# Patient Record
Sex: MALE | Race: WHITE | NOT HISPANIC OR LATINO | Employment: OTHER | URBAN - METROPOLITAN AREA
[De-identification: names, ages, dates, MRNs, and addresses within clinical notes are randomized per-mention and may not be internally consistent; named-entity substitution may affect disease eponyms.]

---

## 2017-01-10 RX ORDER — FUROSEMIDE 20 MG/1
TABLET ORAL
Qty: 90 TABLET | Refills: 1 | OUTPATIENT
Start: 2017-01-10

## 2017-01-10 RX ORDER — FOLIC ACID 1 MG/1
TABLET ORAL
Qty: 180 TABLET | Refills: 1 | Status: SHIPPED | OUTPATIENT
Start: 2017-01-10 | End: 2017-07-19 | Stop reason: SDUPTHER

## 2017-01-11 ENCOUNTER — CONVERSION ENCOUNTER (OUTPATIENT)
Dept: INTERNAL MEDICINE | Facility: CLINIC | Age: 72
End: 2017-01-11

## 2017-01-11 ENCOUNTER — OFFICE VISIT (OUTPATIENT)
Dept: INTERNAL MEDICINE | Facility: CLINIC | Age: 72
End: 2017-01-11

## 2017-01-11 VITALS
OXYGEN SATURATION: 95 % | RESPIRATION RATE: 20 BRPM | BODY MASS INDEX: 38.73 KG/M2 | SYSTOLIC BLOOD PRESSURE: 130 MMHG | TEMPERATURE: 98.8 F | WEIGHT: 251 LBS | DIASTOLIC BLOOD PRESSURE: 80 MMHG | HEART RATE: 78 BPM

## 2017-01-11 DIAGNOSIS — Z79.4 TYPE 2 DIABETES MELLITUS WITHOUT COMPLICATION, WITH LONG-TERM CURRENT USE OF INSULIN (HCC): ICD-10-CM

## 2017-01-11 DIAGNOSIS — R97.20 ELEVATED PSA: ICD-10-CM

## 2017-01-11 DIAGNOSIS — E66.01 MORBID OBESITY DUE TO EXCESS CALORIES (HCC): Primary | ICD-10-CM

## 2017-01-11 DIAGNOSIS — E11.9 TYPE 2 DIABETES MELLITUS WITHOUT COMPLICATION, WITH LONG-TERM CURRENT USE OF INSULIN (HCC): ICD-10-CM

## 2017-01-11 PROCEDURE — 99213 OFFICE O/P EST LOW 20 MIN: CPT | Performed by: NURSE PRACTITIONER

## 2017-01-11 NOTE — MR AVS SNAPSHOT
Alberto Bailey   1/11/2017 8:30 AM   Office Visit    Provider:  ALIREZA Cervantes   Department:  [facility] INTERNAL MEDICINE   Dept Phone:  [phone]                Your Full Care Plan              Today's Medication Changes          These changes are accurate as of: 1/11/17  9:58 AM.  If you have any questions, ask your nurse or doctor.               Medication(s)that have changed:     atenolol 25 MG tablet   Commonly known as:  TENORMIN   TAKE 1 TABLET BY MOUTH EVERY MORNING   What changed:  Another medication with the same name was removed. Continue taking this medication, and follow the directions you see here.       metOLazone 5 MG tablet   Commonly known as:  ZAROXOLYN   TAKE 0.5 TABLETS BY MOUTH 2 (TWO) TIMES A WEEK.   What changed:  Another medication with the same name was removed. Continue taking this medication, and follow the directions you see here.                  Your Updated Medication List          This list is accurate as of: 1/11/17  9:58 AM.  Always use your most recent med list.                acetaminophen 500 MG tablet   Commonly known as:  TYLENOL       albuterol 108 (90 BASE) MCG/ACT inhaler   Commonly known as:  PROVENTIL HFA;VENTOLIN HFA       allopurinol 300 MG tablet   Commonly known as:  ZYLOPRIM   TAKE 1 TABLET BY MOUTH EVERY DAY       aspirin 81 MG tablet       atenolol 25 MG tablet   Commonly known as:  TENORMIN   TAKE 1 TABLET BY MOUTH EVERY MORNING       buPROPion  MG 24 hr tablet   Commonly known as:  WELLBUTRIN XL   TAKE 1 TABLET EVERY MORNING       fluticasone 50 MCG/ACT nasal spray   Commonly known as:  FLONASE       folic acid 1 MG tablet   Commonly known as:  FOLVITE   TAKE 2 TABLETS EVERY DAY       furosemide 20 MG tablet   Commonly known as:  LASIX   Take 1 tablet by mouth every other day. Even days only       glucose blood test strip   Commonly known as:  ACCU-CHEK SERENITY PLUS   TEST 4 TIMES DAILY (Dx code: E11.9)          Insulin Glargine 100 UNIT/ML injection pen   Commonly known as:  LANTUS SOLOSTAR   Inject 80 Units under the skin Every Evening.       JANUMET  MG per tablet   Generic drug:  sitaGLIPtin-metFORMIN   TAKE 1 TABLET BY MOUTH TWICE A DAY       ketotifen 0.025 % ophthalmic solution   Commonly known as:  ZADITOR       levothyroxine 112 MCG tablet   Commonly known as:  SYNTHROID, LEVOTHROID   Take 1 tablet by mouth every morning.       lovastatin 20 MG tablet   Commonly known as:  MEVACOR   TAKE 1 TABLET BY MOUTH AT BEDTIME       Magnesium Oxide 400 (240 MG) MG tablet   Take 2 tablets by mouth daily.       metOLazone 5 MG tablet   Commonly known as:  ZAROXOLYN   TAKE 0.5 TABLETS BY MOUTH 2 (TWO) TIMES A WEEK.       Multiple Vitamin tablet       NOVOLOG FLEXPEN 100 UNIT/ML solution pen-injector sc pen   Generic drug:  insulin aspart   INJECT 6-12 UNITS 3 TIMES A DAY AS DIRECTED SLIDING SCALE       phentermine 37.5 MG tablet   Commonly known as:  ADIPEX-P   Take 1 tablet by mouth Every Morning Before Breakfast.       potassium chloride 20 MEQ CR tablet   Commonly known as:  K-DUR,KLOR-CON       ramipril 10 MG capsule   Commonly known as:  ALTACE   TAKE 1 CAPSULE EVERY 12 HOURS DAILY.       ranitidine 150 MG capsule   Commonly known as:  ZANTAC   Take 1 capsule by mouth Every Night.       terazosin 10 MG capsule   Commonly known as:  HYTRIN   TAKE ONE CAPSULE BY MOUTH AT BEDTIME       * topiramate 100 MG tablet   Commonly known as:  TOPAMAX   Take 1 tablet by mouth daily before supper.       * topiramate 25 MG tablet   Commonly known as:  TOPAMAX   TAKE 2 TABLET EVERY MORNING  MG AT BEDTIME       Vitamin D 1000 UNITS tablet       * Notice:  This list has 2 medication(s) that are the same as other medications prescribed for you. Read the directions carefully, and ask your doctor or other care provider to review them with you.            You Were Diagnosed With        Codes Comments    Morbid obesity due to  excess calories    -  Primary ICD-10-CM: E66.01  ICD-9-CM: 278.01     Type 2 diabetes mellitus without complication, with long-term current use of insulin     ICD-10-CM: E11.9, Z79.4  ICD-9-CM: 250.00, V58.67     Elevated PSA     ICD-10-CM: R97.20  ICD-9-CM: 790.93       Instructions    1. Continue Lantus 80 u at bedtime. Take Novolog 3 times/day before meals; do not take at bedtime.    2. Take Topiramate 50 mg before breakfast and 100 mg before supper.    3. Eat breakfast at 9:30AM, lunch at 2PM and supper 6-8PM.    4. Decrease bedtime snacking to fruit or low carb yogurt.    5. Start going to gym before breakfast on Monday, Wednesday, Friday for cycling.    6. Prevnar vaccine Rx given to pt - to get vaccine at pharmacy.    7. Plan colonoscopy for the Spring.    8. Continue other meds.    9. Non-fasting f/u in 1 month. Annual fasting exam in March.     Patient Instructions History      [employer] Signup     [facility] [employer] allows you to send messages to your doctor, view your test results, renew your prescriptions, schedule appointments, and more. To sign up, go to [employer] and click on the Sign Up Now link in the New User? box. Enter your [employer] Activation Code exactly as it appears below along with the last four digits of your Social Security Number and your Date of Birth () to complete the sign-up process. If you do not sign up before the expiration date, you must request a new code.    [employer] Activation Code: [email or web address]  Expires: 2017  9:58 AM    If you have questions, you can email [email or web address] or call [phone] to talk to our [employer] staff. Remember, [employer] is NOT to be used for urgent needs. For medical emergencies, dial 911.               Other Info from Your Visit           Your Appointments     Mar 10, 2017  9:00 AM EST   Physical with Eric Foy MD   [facility] INTERNAL [facility] (--)     Oscar Cannon, [place]ar[place].  [address] [place] [id number] [phone]           Arrive 15 minutes prior to appointment.              Allergies     No Known Allergies      Reason for Visit     Diabetes           Vital Signs     Blood Pressure Pulse Temperature Respirations Weight Oxygen Saturation    130/80 (BP Location: Right arm, Patient Position: Sitting) 78 98.8 °F (37.1 °C) (Oral) 20 251 lb (114 kg) 95%    Body Mass Index Smoking Status                38.73 kg/m2 Never Smoker          Problems and Diagnoses Noted     Diabetes    Elevated prostate specific antigen (PSA)    Severe obesity

## 2017-01-11 NOTE — PROGRESS NOTES
Subjective   Alberto Bailey is a 71 y.o. male.     History of Present Illness     1. Diabetes: Type 2. Pt's HA1c was 7.8 in early December. He reports today that his FBG had been running about 130 during the holidays. Last week he changed his nighttime snacking to eating low carb yogurt and his FBG since has ranged . Pt reports he had hard month in December; 4 friends passed away, car having trouble and home furnace trouble. This stress triggered poor eating habits and times. Also no exercise. Current meds: Lantus 80u bedtime, Novolog per sliding scale before meals and occasionally before bed if he eats large snack, Janumet 50/1000 mg BID. He plans to improve his diet and exercise program this month.    2. Obesity: Pt has gained 3# in the past month, which he attributes to poor eating habits last month. He continues taking Phentermine 37.5 daily. He takes Topiramate 50 mg before breakfast and 100 mg before bed. He thinks his downfall is eating his dinner meal late at night at times 10PM and late night snacking.     3. Pt has history of hypotestosteronism. He had been getting monthly Testosterone injections. Then in  August his PSA was 4.8. He was treated with Bactrim. Repeat PSA in September was 4.7. In Dec., his testosterone level was 219. Today he is to have repeat PSA; if normal may consider resuming Testosterone injections.    The following portions of the patient's history were reviewed and updated as appropriate: allergies, current medications, past family history, past medical history, past social history, past surgical history and problem list.    Review of Systems   Constitutional: Negative for fatigue and fever.   HENT: Negative for congestion and sore throat.    Eyes: Negative for pain and itching.   Respiratory: Negative for cough and shortness of breath.    Cardiovascular: Positive for leg swelling (intermittent). Negative for chest pain and palpitations.   Gastrointestinal: Negative for abdominal  pain and nausea.   Endocrine: Negative for cold intolerance and heat intolerance.   Genitourinary: Negative for dysuria and hematuria.   Musculoskeletal: Negative for arthralgias and back pain.   Skin: Negative for rash and wound.   Allergic/Immunologic: Negative for environmental allergies and food allergies.   Neurological: Negative for dizziness and headaches.   Hematological: Negative for adenopathy. Does not bruise/bleed easily.   Psychiatric/Behavioral: Positive for dysphoric mood (mildly depressed this past month improving). Negative for sleep disturbance. The patient is not nervous/anxious.        Objective   Blood pressure 130/80, pulse 78, temperature 98.8 °F (37.1 °C), temperature source Oral, resp. rate 20, weight 251 lb (114 kg), SpO2 95 %.    Physical Exam   Constitutional: He is oriented to person, place, and time. He appears well-developed and well-nourished.   Cardiovascular: Normal rate, regular rhythm and normal heart sounds.    No leg edema   Pulmonary/Chest: Effort normal and breath sounds normal. He has no wheezes.   Neurological: He is alert and oriented to person, place, and time.   Skin: Skin is warm and dry.   Psychiatric: He has a normal mood and affect. His speech is normal and behavior is normal. Thought content normal. He does not exhibit a depressed mood.   Vitals reviewed.    Procedures  Assessment/Plan   Alberto was seen today for diabetes.    Diagnoses and all orders for this visit:    Morbid obesity due to excess calories    Type 2 diabetes mellitus without complication, with long-term current use of insulin    Elevated PSA  -     PSA    1. Diabetes: Pt lost focus on his diet and exercise program over the past month. His FBG for the past week has improved with better choice of evening snacks. Pt does not want to work with a dietitian. He was counseled to tighten up on his Diabetic diet, eat meals at consistent times: 9:30AM, 2PM, and supper no later than 8PM. He is to limit his  nighttime snack to a piece of fruit or 1-2 low carb yogurts. He is to continue his current dose of Lantus, give Novolog before meals, but not before bed. He is to call in one week with his glucose readings.     2. Obesity: He is to continue Phentermine as ordered by Dr. Foy and Topiramate: take 50 mg before breakfast and switch Topiramate 100 mg to before supper to help curb his supper and nighttime cravings. He is to start going to the gym before breakfast on his days off work: MWF to do cycling.    3. Will repeat PSA today. If normal, may consider resuming Testosterone injections after reviewed by Dr. Foy.     4. Pt given Rx for Prevnar vaccine.    5. He was counseled about benefits of colonoscopy. Plan to refer this Spring.     He is to continue his other meds.    Non-fasting f/u in 1 month. Annual fasting exam in March.    Patient Instructions   1. Continue Lantus 80 u at bedtime. Take Novolog 3 times/day before meals; do not take at bedtime.    2. Take Topiramate 50 mg before breakfast and 100 mg before supper.    3. Eat breakfast at 9:30AM, lunch at 2PM and supper 6-8PM.    4. Decrease bedtime snacking to fruit or low carb yogurt.    5. Start going to gym before breakfast on Monday, Wednesday, Friday for cycling.    6. Prevnar vaccine Rx given to pt - to get vaccine at pharmacy.    7. Plan colonoscopy for the Spring.    8. Continue other meds.    9. Non-fasting f/u in 1 month. Annual fasting exam in March.                Electronically signed by ALIREZA Pichardo 1/11/2017 11:03 AM

## 2017-01-11 NOTE — PATIENT INSTRUCTIONS
1. Continue Lantus 80 u at bedtime. Take Novolog 3 times/day before meals; do not take at bedtime.    2. Take Topiramate 50 mg before breakfast and 100 mg before supper.    3. Eat breakfast at 9:30AM, lunch at 2PM and supper 6-8PM.    4. Decrease bedtime snacking to fruit or low carb yogurt.    5. Start going to gym before breakfast on Monday, Wednesday, Friday for cycling.    6. Prevnar vaccine Rx given to pt - to get vaccine at pharmacy.    7. Plan colonoscopy for the Spring.    8. Continue other meds.    9. Non-fasting f/u in 1 month. Annual fasting exam in March.

## 2017-01-16 LAB — PSA SERPL-MCNC: 3.5 NG/ML (ref 0–4)

## 2017-01-20 ENCOUNTER — TELEPHONE (OUTPATIENT)
Dept: INTERNAL MEDICINE | Facility: CLINIC | Age: 72
End: 2017-01-20

## 2017-01-20 NOTE — TELEPHONE ENCOUNTER
Talked to Pt and told him to fax his blood sugar reading to us. I gave him our fax number and he said he would do that.

## 2017-01-25 ENCOUNTER — TELEPHONE (OUTPATIENT)
Dept: INTERNAL MEDICINE | Facility: CLINIC | Age: 72
End: 2017-01-25

## 2017-01-27 ENCOUNTER — TELEPHONE (OUTPATIENT)
Dept: INTERNAL MEDICINE | Facility: CLINIC | Age: 72
End: 2017-01-27

## 2017-02-08 ENCOUNTER — OFFICE VISIT (OUTPATIENT)
Dept: INTERNAL MEDICINE | Facility: CLINIC | Age: 72
End: 2017-02-08

## 2017-02-08 VITALS
SYSTOLIC BLOOD PRESSURE: 130 MMHG | DIASTOLIC BLOOD PRESSURE: 70 MMHG | HEART RATE: 90 BPM | BODY MASS INDEX: 38.67 KG/M2 | RESPIRATION RATE: 20 BRPM | WEIGHT: 250.6 LBS | TEMPERATURE: 98.7 F | OXYGEN SATURATION: 96 %

## 2017-02-08 DIAGNOSIS — E23.0 GONADOTROPIN DEFICIENCY (HCC): Primary | ICD-10-CM

## 2017-02-08 DIAGNOSIS — Z79.4 TYPE 2 DIABETES MELLITUS WITHOUT COMPLICATION, WITH LONG-TERM CURRENT USE OF INSULIN (HCC): ICD-10-CM

## 2017-02-08 DIAGNOSIS — E23.0 GONADOTROPIN DEFICIENCY (HCC): ICD-10-CM

## 2017-02-08 DIAGNOSIS — E66.01 MORBID OBESITY DUE TO EXCESS CALORIES (HCC): Primary | ICD-10-CM

## 2017-02-08 DIAGNOSIS — R60.1 GENERALIZED EDEMA: ICD-10-CM

## 2017-02-08 DIAGNOSIS — E11.9 TYPE 2 DIABETES MELLITUS WITHOUT COMPLICATION, WITH LONG-TERM CURRENT USE OF INSULIN (HCC): ICD-10-CM

## 2017-02-08 PROCEDURE — 96372 THER/PROPH/DIAG INJ SC/IM: CPT | Performed by: NURSE PRACTITIONER

## 2017-02-08 PROCEDURE — 99213 OFFICE O/P EST LOW 20 MIN: CPT | Performed by: NURSE PRACTITIONER

## 2017-02-08 RX ORDER — FUROSEMIDE 20 MG/1
20 TABLET ORAL DAILY
Qty: 90 TABLET | Refills: 1 | Status: SHIPPED | OUTPATIENT
Start: 2017-02-08 | End: 2017-08-01 | Stop reason: SDUPTHER

## 2017-02-08 RX ORDER — TESTOSTERONE CYPIONATE 200 MG/ML
200 INJECTION, SOLUTION INTRAMUSCULAR
Qty: 10 ML | Refills: 0 | OUTPATIENT
Start: 2017-02-08 | End: 2018-01-01

## 2017-02-08 RX ORDER — TESTOSTERONE CYPIONATE 200 MG/ML
200 INJECTION, SOLUTION INTRAMUSCULAR ONCE
Status: COMPLETED | OUTPATIENT
Start: 2017-02-08 | End: 2017-02-08

## 2017-02-08 RX ADMIN — TESTOSTERONE CYPIONATE 200 MG: 200 INJECTION, SOLUTION INTRAMUSCULAR at 17:38

## 2017-02-08 NOTE — PATIENT INSTRUCTIONS
1. Start going to gym at least 3 days/week for stationary cycling.    2. Stop eating salty snacks, including pretzels. Good snacks: small container yogurt, apple, other fruit. Limit salt in rest of diet. Tighten up on Diabetic diet. Salty snacks most likely contributing to edema.     3. Continue current insulin dosing.    4. Bring blood sugar readings to every visit.    5. Take Tylenol ES 1-2 tabs each morning for arthritis pain as needed.    6. Track BP weekly and bring readings to each visit.    7. Monthly Testosterone injections resumed today.     8. May take additional dose of Furosemide today or tomorrow for edema.    9. Continue other meds.    10. Annual fasting exam in one month.

## 2017-02-08 NOTE — PROGRESS NOTES
Subjective   Alberto Bailey is a 71 y.o. male.     History of Present Illness     1. Diabetes: Pt reports his fasting blood glucose has averaged  for the past month and glucose before supper has averaged . He denies any symptoms of hypoglycemia. He continues Lantus 80 u QHS and Janumet 50/1000 mg BID. He eats breakfast and supper daily, but sometimes skips lunch. He gives Novolog prior to every meal according to the sliding scale.     2. Obesity: Pt's weight has remained stable over the past month with BMI of 38. He is taking Phentermine 37.5 mg daily, Topiramate 50 mg QAM and Topiramate 100 mg before supper. He feels that the evening Topiramate does reduce his desire to snack in the evenings. He reports giving up ice cream, substituting yogurt and fruit for evening snacking, yet he admits to also eating pretzels for snacking over the past month. He has been to the gym only 3 times in the past month, so very little exercise.    3. Edema: Pt reports he has had more generalized edema over the past month. He is taking Furosemide 20 mg daily and Metolazone 5mg every Tuesday and Friday. He feels his lack of weight loss may be due to retaining some fluid.    4. Pt c/o intermittent left knee pain over the past month. He feels he may be compensating for his chronic left hip pain, which has improved. He has had his left knee evaluated by Dr. Johnson in the past 2 years and was told that he has minimal arthritis in his knee. He has not been taking any medication for pain.    5. Pt has been off monthly testosterone injections due to elevated PSA last Fall of 4.8. He was treated with Bactrim in August. His PSA returned to 3.5 in January. He was advised that he may resume his monthly testosterone injections per Dr. Foy.     The following portions of the patient's history were reviewed and updated as appropriate: allergies, current medications, past family history, past medical history, past social history, past  surgical history and problem list.    Review of Systems   Constitutional: Negative for fatigue and fever.   HENT: Negative for congestion and sore throat.    Eyes: Negative for pain and itching.   Respiratory: Negative for cough and shortness of breath.    Cardiovascular: Positive for leg swelling (mild). Negative for chest pain and palpitations.   Gastrointestinal: Negative for abdominal pain and nausea.   Endocrine: Negative for cold intolerance and heat intolerance.   Genitourinary: Negative for dysuria and hematuria.   Musculoskeletal: Positive for arthralgias (left knee). Negative for back pain.   Skin: Negative for rash and wound.   Allergic/Immunologic: Negative for environmental allergies and food allergies.   Neurological: Negative for dizziness and headaches.   Hematological: Negative for adenopathy. Does not bruise/bleed easily.   Psychiatric/Behavioral: Negative for sleep disturbance. The patient is not nervous/anxious.        Objective   Blood pressure 130/70, pulse 90, temperature 98.7 °F (37.1 °C), temperature source Oral, resp. rate 20, weight 250 lb 9.6 oz (114 kg), SpO2 96 %.    Physical Exam   Constitutional: He is oriented to person, place, and time. He appears well-developed.   obese   Cardiovascular: Normal rate, regular rhythm and normal heart sounds.    No murmur heard.  Mild non-pitting edema bilateral lower legs   Pulmonary/Chest: Effort normal and breath sounds normal. He has no wheezes.   Abdominal: Soft. Bowel sounds are normal. There is no tenderness.   Musculoskeletal:   90% flexion left knee with mild pain upon resisted extension. No pain upon palpation of knee. No inflammation or effusion noted. Left hip 75% ROM without pain.    Neurological: He is alert and oriented to person, place, and time.   Skin: Skin is warm and dry.   Psychiatric: He has a normal mood and affect. His speech is normal and behavior is normal.   Vitals reviewed.    Procedures  Assessment/Plan   Alberto was seen  today for diabetes.    Diagnoses and all orders for this visit:    Morbid obesity due to excess calories    Type 2 diabetes mellitus without complication, with long-term current use of insulin    Gonadotropin deficiency    Generalized edema  -     furosemide (LASIX) 20 MG tablet; Take 1 tablet by mouth Daily.      1. Diabetes: Blood glucose is apparently in good control currently. He is to continue his current medications. He was counseled about changes to improve his diet, including watching his carbs and salt intake, with better choices for evening snacks.     2. Obesity: His BMI remains at 38. He was advised to get back to gym 3 days weekly and start working on stationary cycle. He is to continue current doses of Phentermine and Topiramate for the next month.     3. Edema: Pt has mild generalized edema today. He was instructed to cut salty foods out of his diet. He is to continue his current doses of Furosemide and Metolazone, but may take an additional Furosemide today or tomorrow.     4. Left knee pain: Exam fairly benign today. Return to cycling should help strengthen his support muscles. He was instructed to take Tylenol ES 1-2 tabs each morning for pain as needed. Will reassess in one month.     5. Testosterone injection was given today.    He is to continue his other meds.    Annual fasting exam in 1 month.    Patient Instructions   1. Start going to gym at least 3 days/week for stationary cycling.    2. Stop eating salty snacks, including pretzels. Good snacks: small container yogurt, apple, other fruit. Limit salt in rest of diet. Tighten up on Diabetic diet. Salty snacks most likely contributing to edema.     3. Continue current insulin dosing.    4. Bring blood sugar readings to every visit.    5. Take Tylenol ES 1-2 tabs each morning for arthritis pain as needed.    6. Track BP weekly and bring readings to each visit.    7. Monthly Testosterone injections resumed today.     8. Continue other  meds.    9. Annual fasting exam in one month.          Electronically signed by ALIREZA Pichardo 2/8/2017 10:00 AM

## 2017-02-15 RX ORDER — TERAZOSIN 10 MG/1
CAPSULE ORAL
Qty: 90 CAPSULE | Refills: 1 | Status: SHIPPED | OUTPATIENT
Start: 2017-02-15 | End: 2017-08-11 | Stop reason: SDUPTHER

## 2017-03-10 ENCOUNTER — OFFICE VISIT (OUTPATIENT)
Dept: INTERNAL MEDICINE | Facility: CLINIC | Age: 72
End: 2017-03-10

## 2017-03-10 VITALS
DIASTOLIC BLOOD PRESSURE: 80 MMHG | HEIGHT: 67 IN | TEMPERATURE: 98.2 F | HEART RATE: 76 BPM | OXYGEN SATURATION: 97 % | SYSTOLIC BLOOD PRESSURE: 134 MMHG | WEIGHT: 252 LBS | RESPIRATION RATE: 16 BRPM | BODY MASS INDEX: 39.55 KG/M2

## 2017-03-10 DIAGNOSIS — I10 ESSENTIAL HYPERTENSION: ICD-10-CM

## 2017-03-10 DIAGNOSIS — E79.0 HYPERURICEMIA: ICD-10-CM

## 2017-03-10 DIAGNOSIS — E03.4 HYPOTHYROIDISM DUE TO ACQUIRED ATROPHY OF THYROID: ICD-10-CM

## 2017-03-10 DIAGNOSIS — E66.01 MORBID OBESITY DUE TO EXCESS CALORIES (HCC): ICD-10-CM

## 2017-03-10 DIAGNOSIS — E53.9 VITAMIN B DEFICIENCY: ICD-10-CM

## 2017-03-10 DIAGNOSIS — I34.0 NON-RHEUMATIC MITRAL REGURGITATION: ICD-10-CM

## 2017-03-10 DIAGNOSIS — R97.20 ELEVATED PSA: ICD-10-CM

## 2017-03-10 DIAGNOSIS — E23.0 GONADOTROPIN DEFICIENCY (HCC): ICD-10-CM

## 2017-03-10 DIAGNOSIS — R60.1 GENERALIZED EDEMA: ICD-10-CM

## 2017-03-10 DIAGNOSIS — E55.9 VITAMIN D DEFICIENCY: ICD-10-CM

## 2017-03-10 DIAGNOSIS — Z79.4 TYPE 2 DIABETES MELLITUS WITHOUT COMPLICATION, WITH LONG-TERM CURRENT USE OF INSULIN (HCC): Primary | ICD-10-CM

## 2017-03-10 DIAGNOSIS — F33.42 RECURRENT MAJOR DEPRESSIVE DISORDER, IN FULL REMISSION (HCC): ICD-10-CM

## 2017-03-10 DIAGNOSIS — E78.49 OTHER HYPERLIPIDEMIA: ICD-10-CM

## 2017-03-10 DIAGNOSIS — D64.9 ANEMIA, UNSPECIFIED TYPE: ICD-10-CM

## 2017-03-10 DIAGNOSIS — Z12.5 ENCOUNTER FOR SCREENING FOR MALIGNANT NEOPLASM OF PROSTATE: ICD-10-CM

## 2017-03-10 DIAGNOSIS — K21.9 GASTROESOPHAGEAL REFLUX DISEASE WITHOUT ESOPHAGITIS: ICD-10-CM

## 2017-03-10 DIAGNOSIS — Z00.00 PREVENTATIVE HEALTH CARE: ICD-10-CM

## 2017-03-10 DIAGNOSIS — M16.0 PRIMARY OSTEOARTHRITIS OF BOTH HIPS: ICD-10-CM

## 2017-03-10 DIAGNOSIS — E11.9 TYPE 2 DIABETES MELLITUS WITHOUT COMPLICATION, WITH LONG-TERM CURRENT USE OF INSULIN (HCC): Primary | ICD-10-CM

## 2017-03-10 PROCEDURE — 93000 ELECTROCARDIOGRAM COMPLETE: CPT | Performed by: INTERNAL MEDICINE

## 2017-03-10 PROCEDURE — 99215 OFFICE O/P EST HI 40 MIN: CPT | Performed by: INTERNAL MEDICINE

## 2017-03-10 PROCEDURE — 96372 THER/PROPH/DIAG INJ SC/IM: CPT | Performed by: INTERNAL MEDICINE

## 2017-03-10 RX ORDER — TESTOSTERONE CYPIONATE 200 MG/ML
200 INJECTION, SOLUTION INTRAMUSCULAR ONCE
Status: COMPLETED | OUTPATIENT
Start: 2017-03-10 | End: 2017-03-10

## 2017-03-10 RX ADMIN — TESTOSTERONE CYPIONATE 200 MG: 200 INJECTION, SOLUTION INTRAMUSCULAR at 17:33

## 2017-03-10 NOTE — PROGRESS NOTES
"Subjective   Alberto Bailey is a 71 y.o. male.     History of Present Illness         The following portions of the patient's history were reviewed and updated as appropriate: allergies, current medications, past family history, past medical history, past social history, past surgical history and problem list.    Review of Systems    Objective   Blood pressure 134/80, pulse 76, temperature 98.2 °F (36.8 °C), temperature source Oral, resp. rate 16, height 67\" (170.2 cm), weight 252 lb (114 kg), SpO2 97 %.    Physical Exam  Procedures  Assessment/Plan   There are no diagnoses linked to this encounter.  There are no Patient Instructions on file for this visit.    This encounter was created in error - please disregard.    This note was created in error and has been voided as erroneous.  Electronically signed Eric Foy M.D.         "

## 2017-03-10 NOTE — PATIENT INSTRUCTIONS
1.  90 minutes of exercise at the [facility] - 3 days weekly.  Consider stationary cycle, water aerobics, resistance exercises.    2.  Follow a low-calorie diabetic diet - low in salt and low in sugar.  Plan new visits to registered dietitian.    3.  Continue usual medicines and supplements - as listed.    4.  ON Monday Wednesday Friday - check your blood sugar 2 hours after meal - analyze effects of eating.    5.  Obtain knee-high support hose - to minimize edema.    6.  Continue monthly testosterone injections.    7.  Return in 3 months - fasting checkup.

## 2017-03-11 LAB
25(OH)D3+25(OH)D2 SERPL-MCNC: 36.5 NG/ML
ALBUMIN SERPL-MCNC: 4.5 G/DL (ref 3.2–4.8)
ALBUMIN/GLOB SERPL: 1.7 G/DL (ref 1.5–2.5)
ALP SERPL-CCNC: 98 U/L (ref 25–100)
ALT SERPL-CCNC: 29 U/L (ref 7–40)
APPEARANCE UR: CLEAR
AST SERPL-CCNC: 31 U/L (ref 0–33)
BASOPHILS # BLD AUTO: 0.02 10*3/MM3 (ref 0–0.2)
BASOPHILS NFR BLD AUTO: 0.2 % (ref 0–1)
BILIRUB SERPL-MCNC: 0.5 MG/DL (ref 0.3–1.2)
BILIRUB UR QL STRIP: NEGATIVE
BUN SERPL-MCNC: 28 MG/DL (ref 9–23)
BUN/CREAT SERPL: 21.5 (ref 7–25)
CALCIUM SERPL-MCNC: 10.4 MG/DL (ref 8.7–10.4)
CHLORIDE SERPL-SCNC: 96 MMOL/L (ref 99–109)
CHOLEST SERPL-MCNC: 120 MG/DL (ref 0–200)
CO2 SERPL-SCNC: 38 MMOL/L (ref 20–31)
COLOR UR: YELLOW
CONV COMMENT: ABNORMAL
CREAT SERPL-MCNC: 1.3 MG/DL (ref 0.6–1.3)
CRP SERPL-MCNC: 6.1 MG/DL (ref 0–10)
EOSINOPHIL # BLD AUTO: 0.52 10*3/MM3 (ref 0.1–0.3)
EOSINOPHIL NFR BLD AUTO: 5.5 % (ref 0–3)
ERYTHROCYTE [DISTWIDTH] IN BLOOD BY AUTOMATED COUNT: 14.4 % (ref 11.3–14.5)
GLOBULIN SER CALC-MCNC: 2.6 GM/DL
GLUCOSE SERPL-MCNC: 122 MG/DL (ref 70–100)
GLUCOSE UR QL: NEGATIVE
HBA1C MFR BLD: 7.4 % (ref 4.8–5.6)
HCT VFR BLD AUTO: 38.9 % (ref 38.9–50.9)
HDLC SERPL-MCNC: 37 MG/DL (ref 40–60)
HGB BLD-MCNC: 12.4 G/DL (ref 13.1–17.5)
HGB UR QL STRIP: NEGATIVE
IMM GRANULOCYTES # BLD: 0.02 10*3/MM3 (ref 0–0.03)
IMM GRANULOCYTES NFR BLD: 0.2 % (ref 0–0.6)
KETONES UR QL STRIP: NEGATIVE
LDLC SERPL CALC-MCNC: 59 MG/DL (ref 0–100)
LEUKOCYTE ESTERASE UR QL STRIP: NEGATIVE
LYMPHOCYTES # BLD AUTO: 2.25 10*3/MM3 (ref 0.6–4.8)
LYMPHOCYTES NFR BLD AUTO: 24 % (ref 24–44)
MAGNESIUM SERPL-MCNC: 1.5 MG/DL (ref 1.3–2.7)
MCH RBC QN AUTO: 31.2 PG (ref 27–31)
MCHC RBC AUTO-ENTMCNC: 31.9 G/DL (ref 32–36)
MCV RBC AUTO: 97.7 FL (ref 80–99)
MONOCYTES # BLD AUTO: 0.68 10*3/MM3 (ref 0–1)
MONOCYTES NFR BLD AUTO: 7.3 % (ref 0–12)
NEUTROPHILS # BLD AUTO: 5.88 10*3/MM3 (ref 1.5–8.3)
NEUTROPHILS NFR BLD AUTO: 62.8 % (ref 41–71)
NITRITE UR QL STRIP: NEGATIVE
PH UR STRIP: 6 [PH] (ref 5–8)
PLATELET # BLD AUTO: 176 10*3/MM3 (ref 150–450)
POTASSIUM SERPL-SCNC: 4.1 MMOL/L (ref 3.5–5.5)
PROT SERPL-MCNC: 7.1 G/DL (ref 5.7–8.2)
PROT UR QL STRIP: NEGATIVE
RBC # BLD AUTO: 3.98 10*6/MM3 (ref 4.2–5.76)
SODIUM SERPL-SCNC: 139 MMOL/L (ref 132–146)
SP GR UR: 1.01 (ref 1–1.03)
T3FREE SERPL-MCNC: 3 PG/ML (ref 2–4.4)
T4 FREE SERPL-MCNC: 1.26 NG/DL (ref 0.89–1.76)
TESTOST SERPL-MCNC: 242 NG/DL (ref 348–1197)
TRIGL SERPL-MCNC: 121 MG/DL (ref 0–150)
TSH SERPL DL<=0.005 MIU/L-ACNC: 4.07 MIU/ML (ref 0.35–5.35)
URATE SERPL-MCNC: 6.7 MG/DL (ref 3.7–9.2)
UROBILINOGEN UR STRIP-MCNC: NORMAL MG/DL
VIT B12 SERPL-MCNC: 454 PG/ML (ref 211–911)
VLDLC SERPL CALC-MCNC: 24.2 MG/DL
WBC # BLD AUTO: 9.37 10*3/MM3 (ref 3.5–10.8)

## 2017-03-11 RX ORDER — LEVOTHYROXINE SODIUM 112 UG/1
TABLET ORAL
Qty: 90 TABLET | Refills: 1 | Status: CANCELLED | OUTPATIENT
Start: 2017-03-11

## 2017-03-12 NOTE — PROGRESS NOTES
Subjective   Alberto Bailey is a 71 y.o. male.     Chief Complaint   Patient presents with   • Hyperlipidemia       History of Present Illness     The patient has had a lifelong history of severe obesity often in the morbid range.  His total body weight has been as high as 300.  He has been extensively counseled and monitored for weight loss.  He is currently on topiramate and phentermine for appetite suppression.  His last weight was 250 several months ago.  He has a profound family history for stroke in both parents.  He has moderate cardiovascular risk from type 2 diabetes with poor control and hemoglobin A1c of 7.8 last fall.  He has moderate hyperlipidemia and hypertension.    The patient's had moderate hip pain progressing in recent years.  X-ray shows advanced DJD of both hips.  He has greatly limited his walking.  He has tried to maintain an exercise program at the [facility] but often limits his visits months at a time.  Tylenol gives him adequate pain control.    The following portions of the patient's history were reviewed and updated as appropriate: allergies, current medications, past family history, past medical history, past social history, past surgical history and problem list.    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   • Edema 05/02/2016   • Gastroesophageal reflux disease 05/02/2016   • Gout 05/02/2016   • Hyperlipidemia 05/02/2016   • Hypertension 05/02/2016   • Hypothyroidism 05/02/2016   • Mitral valve insufficiency 05/02/2016   • Morbid obesity 05/02/2016   • Osteoarthritis of hip 05/02/2016   • Gonadotropin deficiency 05/02/2016   • Preventative health care 08/19/2016   • Elevated PSA 11/02/2016   • Hyperuricemia 03/10/2017     Resolved Ambulatory Problems     Diagnosis Date Noted   • Exposure to hepatitis C 12/02/2016     Past Medical History    Diagnosis Date   • Diabetes mellitus, type 2    • Essential hypertriglyceridemia    • GERD (gastroesophageal reflux disease)    • Hypogonadism male    • Hypomagnesemia    • Joint pain, knee    • Metabolic encephalopathy    • Mitral regurgitation    • Osteoarthritis    • Sleep disturbances    • Strain, back    • Vitamin D deficiency      Past Surgical History   Procedure Laterality Date   • Tonsillectomy       Family History   Problem Relation Age of Onset   • Alzheimer's disease Mother    • Breast cancer Mother    • Diabetes Mother    • Stroke Mother       age 75 approximately   • Thyroid disease Mother    • Hypertension Father    • Kidney failure Father       age 75   • Stroke Father    • Glaucoma Sister    • Osteoarthritis Sister      Knee replacement   • Brain cancer Paternal Grandmother    • Glaucoma Maternal Grandmother    • Colon polyps Cousin      Social History     Social History   • Marital status: Single     Spouse name: N/A   • Number of children: N/A   • Years of education: N/A     Occupational History   • Not on file.     Social History Main Topics   • Smoking status: Never Smoker   • Smokeless tobacco: Never Used   • Alcohol use No   • Drug use: No   • Sexual activity: Not on file     Other Topics Concern   • Not on file     Social History Narrative    Domestic life : Lives in private home alone        [employer] : [employer]        Sleep hygiene : Sleeps 5 or 6 hours nightly        Caffeine use : No caffeine        Exercise habits : Walks 10 minutes 4 times daily while at work 3 days weekly.  Goes [facility] [employer] intermittently.        Diet : Low calorie diabetic diet low in salt and low in sugar        Occupation : Retired  as a .  Works at [employer] 3 days weekly.        Hearing : No impairment        Vision : Corrects with glasses        Driving : No limitations.             Review of Systems   Constitutional: Positive for fatigue. Negative for appetite  "change.        Fatigue improved on testosterone injections   HENT: Negative for ear pain and sore throat.    Eyes: Negative for itching and visual disturbance.   Respiratory: Negative for cough and shortness of breath.    Cardiovascular: Negative for chest pain and palpitations.   Gastrointestinal: Negative for abdominal pain and nausea.   Endocrine: Negative for cold intolerance and heat intolerance.   Genitourinary: Negative for dysuria and hematuria.   Musculoskeletal: Positive for arthralgias, back pain and gait problem.   Skin: Negative for rash and wound.   Allergic/Immunologic: Negative for environmental allergies and food allergies.   Neurological: Negative for dizziness and headaches.   Hematological: Negative for adenopathy. Does not bruise/bleed easily.   Psychiatric/Behavioral: Positive for dysphoric mood. Negative for sleep disturbance. The patient is not nervous/anxious.         Mood much more upbeat on bupropion       Objective   Blood pressure 134/80, pulse 76, temperature 98.2 °F (36.8 °C), temperature source Oral, resp. rate 16, height 67\" (170.2 cm), weight 252 lb (114 kg), SpO2 97 %.    Physical Exam   Constitutional: He is oriented to person, place, and time. He appears well-developed and well-nourished. No distress.   HENT:   Right Ear: External ear normal.   Left Ear: External ear normal.   Nose: Nose normal.   Mouth/Throat: Oropharynx is clear and moist.   Eyes: EOM are normal. Pupils are equal, round, and reactive to light. No scleral icterus.   Neck: Normal range of motion. Neck supple. No JVD present. No thyromegaly present.   Cardiovascular: Normal rate, regular rhythm, normal heart sounds and intact distal pulses.    Pulmonary/Chest: Effort normal and breath sounds normal. He has no wheezes. He has no rales.   Abdominal: Soft. Bowel sounds are normal. He exhibits no distension and no mass. There is no tenderness. No hernia.   Moderately obese   Genitourinary: Rectum normal, prostate " normal and penis normal.   Genitourinary Comments: Testicles normal   Musculoskeletal: He exhibits no edema or tenderness.   Hips and knees moderate stiffness.  Hips Limited range of motion.   Lymphadenopathy:     He has no cervical adenopathy.   Neurological: He is alert and oriented to person, place, and time. He displays normal reflexes. No cranial nerve deficit. He exhibits normal muscle tone. Coordination normal.   Vibratory normal  Romberg negative  Gait normal  Plantars downgoing  Monofilament normal   Skin: Skin is warm and dry. No rash noted.   Psychiatric: He has a normal mood and affect. His behavior is normal. Judgment and thought content normal.   Nursing note and vitals reviewed.      ECG 12 Lead  Date/Time: 3/10/2017 10:34 AM  Performed by: ERIC FOY  Authorized by: ERIC FOY   Interpreted by ED physician: Eric Foy M.D.  Comparison: compared with previous ECG from 1/11/2016  Similar to previous ECG  Rhythm: sinus rhythm  Rate: normal  BPM: 75  Conduction: complete RBBB, LAFB and non-specific intraventricular conduction delay  ST Segments: ST segments normal  T Waves: T waves normal  QRS axis: left  Other findings: LAE  Clinical impression: abnormal ECG  Comments: Indication mitral regurgitation  Mild sinus arrhythmia is new.  Otherwise baseline EKG.          Assessment/Plan   Alberto was seen today for hyperlipidemia.    Diagnoses and all orders for this visit:    Type 2 diabetes mellitus without complication, with long-term current use of insulin  -     Hemoglobin A1c    Hypothyroidism due to acquired atrophy of thyroid  -     TSH  -     T4, Free  -     T3, Free    Gonadotropin deficiency  -     Testosterone Cypionate (DEPOTESTOTERONE CYPIONATE) injection 200 mg; Inject 1 mL into the shoulder, thigh, or buttocks 1 (One) Time.  -     Testosterone    Other hyperlipidemia  -     Comprehensive Metabolic Panel  -     Lipid Panel    Essential hypertension  -     Urinalysis With /  Microscopic If Indicated    Non-rheumatic mitral regurgitation  -     ECG 12 Lead    Gastroesophageal reflux disease without esophagitis    Morbid obesity due to excess calories    Anemia, unspecified type  -     CBC & Differential  -     C-reactive Protein    Elevated PSA  -     Cancel: PSA Screen    Preventative health care    Recurrent major depressive disorder, in full remission    Generalized edema  -     Magnesium    Hyperuricemia  -     Uric Acid    Vitamin D deficiency  -     Vitamin D 25 Hydroxy    Vitamin B deficiency  -     Vitamin B12    Encounter for screening for malignant neoplasm of prostate   -     Cancel: PSA Screen    Primary osteoarthritis of both hips      The patient has a severe deterioration in his health poorly controlled diabetes, hyperlipidemia, hypertension, morbid obesity, with a backdrop of his family history of stroke in both parents.  I've counseled him on monitoring all of these conditions to improve his cardiovascular risk.  Fasting to continue on aspirin for primary prevention.    The patient's morbid obesity has improved in recent years on topiramate and phentermine.  I've asked him to continue working with the dietitian to be structural low-calorie diet.    The patient has severe advanced DJD of his hips.  He has limited in his walking but is able to walk 5 or 10 minutes at a time especially at work.  I've courage semimobile every one or 2 hours at work for at least 5-10 minutes.  He should continue Tylenol for symptom control.    The patient's lipid control is adequate with an LDL of 59.  He should continue on lovastatin.    Patient's hypertension is in adequate control on ramipril, atenolol, and terazosin.  His goal systolic blood pressure is 130.  He has an incidental significant edema that has not corrected despite his weight loss.  This appears to represent venous insufficiency.  Diuretics will help both his blood pressure and his edema management.    The patient's had a  recent increase of PSA to 4.7.  This apparently was a mild prostate congestion with possible prostatitis.  He has corrected and can resume testosterone injections.    The patient's TSH is gradually increase to 4.0 last year.  He will need increase his levothyroxine to 125 for goal TSH of 102.    The patient's had substantial anemia in the last few years.  This has greatly corrected now with a hematocrit of 39%.  This may be partly related to his testosterone deficiency and this will be an extra benefit of replacement therapy.    Patient Instructions   1.  90 minutes of exercise at the [facility] - 3 days weekly.  Consider stationary cycle, water aerobics, resistance exercises.    2.  Follow a low-calorie diabetic diet - low in salt and low in sugar.  Plan new visits to registered dietitian.    3.  Continue usual medicines and supplements - as listed.    4.  ON Monday Wednesday Friday - check your blood sugar 2 hours after meal - analyze effects of eating.    5.  Obtain knee-high support hose - to minimize edema.    6.  Continue monthly testosterone injections.    7.  Return in 3 months - fasting checkup.    8.  Hemoglobin A1c mildly improved to 7.4%.  Improve diet and exercise to bring below 7%.    9.  Magnesium level borderline low at 1.5.  Continue magnesium replacement.    10.  TSH indicates mild thyroid insufficiency.  Increase levothyroxine 125 MCG daily.    11.  Other laboratory tests are acceptable and require no change in treatment.    Current Outpatient Prescriptions:   •  acetaminophen (TYLENOL) 500 MG tablet, Take 2 tablets by mouth as needed., Disp: , Rfl:   •  albuterol (PROVENTIL HFA;VENTOLIN HFA) 108 (90 BASE) MCG/ACT inhaler, Inhale 1 puff as needed., Disp: , Rfl:   •  allopurinol (ZYLOPRIM) 300 MG tablet, TAKE 1 TABLET BY MOUTH EVERY DAY, Disp: 90 tablet, Rfl: 1  •  aspirin 81 MG tablet, Take 1 tablet by mouth every morning., Disp: , Rfl:   •  atenolol (TENORMIN) 25 MG tablet, TAKE 1 TABLET BY MOUTH EVERY  MORNING, Disp: 90 tablet, Rfl: 1  •  buPROPion XL (WELLBUTRIN XL) 150 MG 24 hr tablet, TAKE 1 TABLET EVERY MORNING, Disp: 90 tablet, Rfl: 1  •  Cholecalciferol (VITAMIN D) 1000 UNITS tablet, Take 3 tablets by mouth daily., Disp: , Rfl:   •  fluticasone (FLONASE) 50 MCG/ACT nasal spray, 1 spray into each nostril daily as needed., Disp: , Rfl:   •  folic acid (FOLVITE) 1 MG tablet, TAKE 2 TABLETS EVERY DAY, Disp: 180 tablet, Rfl: 1  •  furosemide (LASIX) 20 MG tablet, Take 1 tablet by mouth Daily., Disp: 90 tablet, Rfl: 1  •  glucose blood (ACCU-CHEK SERENITY PLUS) test strip, TEST 4 TIMES DAILY (Dx code: E11.9), Disp: 200 each, Rfl: 2  •  Insulin Glargine (LANTUS SOLOSTAR) 100 UNIT/ML injection pen, Inject 80 Units under the skin Every Evening., Disp: 30 mL, Rfl: 2  •  JANUMET  MG per tablet, TAKE 1 TABLET BY MOUTH TWICE A DAY, Disp: 180 tablet, Rfl: 1  •  levothyroxine (SYNTHROID, LEVOTHROID) 112 MCG tablet, Take 1 tablet by mouth every morning., Disp: 90 tablet, Rfl: 1  •  lovastatin (MEVACOR) 20 MG tablet, TAKE 1 TABLET BY MOUTH AT BEDTIME, Disp: 90 tablet, Rfl: 1  •  Magnesium Oxide 400 (240 MG) MG tablet, Take 2 tablets by mouth daily., Disp: 180 tablet, Rfl: 1  •  metOLazone (ZAROXOLYN) 5 MG tablet, TAKE 0.5 TABLETS BY MOUTH 2 (TWO) TIMES A WEEK., Disp: 30 tablet, Rfl: 1  •  Multiple Vitamin tablet, Take 1 tablet by mouth every morning., Disp: , Rfl:   •  NOVOLOG FLEXPEN 100 UNIT/ML solution pen-injector sc pen, INJECT 6-12 UNITS 3 TIMES A DAY AS DIRECTED SLIDING SCALE, Disp: 100 mL, Rfl: 2  •  phentermine (ADIPEX-P) 37.5 MG tablet, Take 1 tablet by mouth Every Morning Before Breakfast., Disp: 30 tablet, Rfl: 2  •  potassium chloride (K-DUR,KLOR-CON) 20 MEQ CR tablet, Take 1 tablet by mouth daily., Disp: , Rfl:   •  ramipril (ALTACE) 10 MG capsule, TAKE 1 CAPSULE EVERY 12 HOURS DAILY., Disp: 180 capsule, Rfl: 1  •  ranitidine (ZANTAC) 150 MG capsule, Take 1 capsule by mouth Every Night., Disp: 30 capsule,  Rfl: 11  •  terazosin (HYTRIN) 10 MG capsule, TAKE ONE CAPSULE BY MOUTH AT BEDTIME, Disp: 90 capsule, Rfl: 1  •  Testosterone Cypionate (DEPOTESTOTERONE CYPIONATE) 200 MG/ML injection, Inject 1 mL into the shoulder, thigh, or buttocks Every 28 (Twenty-Eight) Days., Disp: 10 mL, Rfl: 0  •  topiramate (TOPAMAX) 100 MG tablet, Take 1 tablet by mouth daily before supper., Disp: 90 tablet, Rfl: 1  •  topiramate (TOPAMAX) 25 MG tablet, TAKE 2 TABLET EVERY MORNING  MG AT BEDTIME, Disp: 60 tablet, Rfl: 5    No Known Allergies    Immunization History   Administered Date(s) Administered   • Influenza, Quadrivalent 10/16/2015   • Pneumococcal Polysaccharide 01/09/2008   • Tdap 03/19/2012   • influenza Split 10/31/2016     Electronically signed Eric Foy M.D.3/12/2017 2:29 PM

## 2017-03-13 RX ORDER — LEVOTHYROXINE SODIUM 0.12 MG/1
112 TABLET ORAL EVERY MORNING
Qty: 90 TABLET | Refills: 1 | Status: SHIPPED | OUTPATIENT
Start: 2017-03-13 | End: 2017-10-08 | Stop reason: SDUPTHER

## 2017-03-13 NOTE — TELEPHONE ENCOUNTER
Pt notified of labs - need to increase Levothyroxine to 125 mcg qd per TGF, anemia a little better, Haic 7.40 (was 7.8) per TGF

## 2017-03-14 RX ORDER — LEVOTHYROXINE SODIUM 112 UG/1
TABLET ORAL
Qty: 90 TABLET | Refills: 1 | OUTPATIENT
Start: 2017-03-14

## 2017-03-31 RX ORDER — RAMIPRIL 10 MG/1
10 CAPSULE ORAL EVERY 12 HOURS
Qty: 180 CAPSULE | Refills: 1 | Status: SHIPPED | OUTPATIENT
Start: 2017-03-31 | End: 2017-10-08 | Stop reason: SDUPTHER

## 2017-04-14 DIAGNOSIS — Z79.4 TYPE 2 DIABETES MELLITUS WITHOUT COMPLICATION, WITH LONG-TERM CURRENT USE OF INSULIN (HCC): ICD-10-CM

## 2017-04-14 DIAGNOSIS — E11.9 TYPE 2 DIABETES MELLITUS WITHOUT COMPLICATION, WITH LONG-TERM CURRENT USE OF INSULIN (HCC): ICD-10-CM

## 2017-04-14 RX ORDER — INSULIN GLARGINE 100 [IU]/ML
INJECTION, SOLUTION SUBCUTANEOUS
Qty: 30 ML | Refills: 2 | Status: SHIPPED | OUTPATIENT
Start: 2017-04-14 | End: 2017-04-25 | Stop reason: SDUPTHER

## 2017-04-17 RX ORDER — ALLOPURINOL 300 MG/1
300 TABLET ORAL DAILY
Qty: 90 TABLET | Refills: 1 | Status: SHIPPED | OUTPATIENT
Start: 2017-04-17 | End: 2017-10-15 | Stop reason: SDUPTHER

## 2017-04-25 DIAGNOSIS — E11.9 TYPE 2 DIABETES MELLITUS WITHOUT COMPLICATION, WITH LONG-TERM CURRENT USE OF INSULIN (HCC): ICD-10-CM

## 2017-04-25 DIAGNOSIS — Z79.4 TYPE 2 DIABETES MELLITUS WITHOUT COMPLICATION, WITH LONG-TERM CURRENT USE OF INSULIN (HCC): ICD-10-CM

## 2017-04-25 RX ORDER — LOVASTATIN 20 MG/1
20 TABLET ORAL NIGHTLY
Qty: 90 TABLET | Refills: 1 | Status: SHIPPED | OUTPATIENT
Start: 2017-04-25 | End: 2017-10-24 | Stop reason: SDUPTHER

## 2017-04-25 RX ORDER — LANOLIN ALCOHOL/MO/W.PET/CERES
2 CREAM (GRAM) TOPICAL DAILY
Qty: 180 TABLET | Refills: 1 | Status: SHIPPED | OUTPATIENT
Start: 2017-04-25 | End: 2017-10-24 | Stop reason: SDUPTHER

## 2017-04-28 RX ORDER — METOLAZONE 5 MG/1
TABLET ORAL
Qty: 30 TABLET | Refills: 1 | Status: SHIPPED | OUTPATIENT
Start: 2017-04-28 | End: 2018-01-01 | Stop reason: SDUPTHER

## 2017-05-10 RX ORDER — INSULIN ASPART 100 [IU]/ML
INJECTION, SOLUTION INTRAVENOUS; SUBCUTANEOUS
Qty: 3 ML | Refills: 2 | Status: SHIPPED | OUTPATIENT
Start: 2017-05-10 | End: 2017-09-18 | Stop reason: SDUPTHER

## 2017-05-15 RX ORDER — BUPROPION HYDROCHLORIDE 150 MG/1
TABLET ORAL
Qty: 90 TABLET | Refills: 1 | Status: SHIPPED | OUTPATIENT
Start: 2017-05-15 | End: 2017-11-10 | Stop reason: SDUPTHER

## 2017-05-15 RX ORDER — ATENOLOL 25 MG/1
TABLET ORAL
Qty: 90 TABLET | Refills: 1 | Status: SHIPPED | OUTPATIENT
Start: 2017-05-15 | End: 2017-11-10 | Stop reason: SDUPTHER

## 2017-06-19 ENCOUNTER — OFFICE VISIT (OUTPATIENT)
Dept: INTERNAL MEDICINE | Facility: CLINIC | Age: 72
End: 2017-06-19

## 2017-06-19 VITALS
WEIGHT: 256 LBS | DIASTOLIC BLOOD PRESSURE: 80 MMHG | TEMPERATURE: 98.9 F | SYSTOLIC BLOOD PRESSURE: 120 MMHG | OXYGEN SATURATION: 95 % | HEART RATE: 68 BPM | RESPIRATION RATE: 16 BRPM | BODY MASS INDEX: 40.1 KG/M2

## 2017-06-19 DIAGNOSIS — I10 ESSENTIAL HYPERTENSION: ICD-10-CM

## 2017-06-19 DIAGNOSIS — E66.01 MORBID OBESITY DUE TO EXCESS CALORIES (HCC): ICD-10-CM

## 2017-06-19 DIAGNOSIS — E23.0 GONADOTROPIN DEFICIENCY (HCC): ICD-10-CM

## 2017-06-19 DIAGNOSIS — E78.49 OTHER HYPERLIPIDEMIA: Primary | ICD-10-CM

## 2017-06-19 DIAGNOSIS — R97.20 ELEVATED PSA: ICD-10-CM

## 2017-06-19 DIAGNOSIS — Z79.4 TYPE 2 DIABETES MELLITUS WITHOUT COMPLICATION, WITH LONG-TERM CURRENT USE OF INSULIN (HCC): ICD-10-CM

## 2017-06-19 DIAGNOSIS — E11.9 TYPE 2 DIABETES MELLITUS WITHOUT COMPLICATION, WITH LONG-TERM CURRENT USE OF INSULIN (HCC): ICD-10-CM

## 2017-06-19 DIAGNOSIS — R73.9 HYPERGLYCEMIA: ICD-10-CM

## 2017-06-19 PROCEDURE — 96372 THER/PROPH/DIAG INJ SC/IM: CPT | Performed by: INTERNAL MEDICINE

## 2017-06-19 PROCEDURE — 99213 OFFICE O/P EST LOW 20 MIN: CPT | Performed by: INTERNAL MEDICINE

## 2017-06-19 RX ORDER — TESTOSTERONE CYPIONATE 200 MG/ML
200 INJECTION, SOLUTION INTRAMUSCULAR ONCE
Status: COMPLETED | OUTPATIENT
Start: 2017-06-19 | End: 2017-06-19

## 2017-06-19 RX ORDER — PHENTERMINE HYDROCHLORIDE 37.5 MG/1
37.5 TABLET ORAL
Qty: 30 TABLET | Refills: 2 | OUTPATIENT
Start: 2017-06-19 | End: 2018-01-01

## 2017-06-19 RX ADMIN — TESTOSTERONE CYPIONATE 200 MG: 200 INJECTION, SOLUTION INTRAMUSCULAR at 09:45

## 2017-06-19 NOTE — PROGRESS NOTES
Subjective   Alberto Bailey is a 71 y.o. male.     History of Present Illness     The patient has a lifelong history of morbid obesity and a 20 year history of progressive diabetes mellitus.  His last hemoglobin A1c had been brought down to 7.4%.  He has made great strides and progressive the last year at stabilizing his diabetes.  He has had a tendency for tightening his diabetic control and then going off of his treatment plan for several months at a time.    The following portions of the patient's history were reviewed and updated as appropriate: allergies, current medications, past family history, past medical history, past social history, past surgical history and problem list.    Review of Systems   Constitutional: Negative for appetite change and fatigue.   Gastrointestinal: Negative for abdominal distention and nausea.   Musculoskeletal: Positive for arthralgias and gait problem.        Moderate hip and left knee pain increasing   Neurological: Negative for dizziness and light-headedness.       Objective   Blood pressure 120/80, pulse 68, temperature 98.9 °F (37.2 °C), temperature source Oral, resp. rate 16, weight 256 lb (116 kg), SpO2 95 %.    Physical Exam   Constitutional: He is oriented to person, place, and time. He appears well-developed and well-nourished. No distress.   Cardiovascular: Normal rate, regular rhythm and normal heart sounds.    Pulmonary/Chest: Effort normal and breath sounds normal. He has no wheezes. He has no rales.   Musculoskeletal: Normal range of motion. He exhibits no edema.   Moderate tenderness of left hip and knee without stiffness or effusion    Neurological: He is alert and oriented to person, place, and time. He exhibits normal muscle tone. Coordination normal.   Psychiatric: He has a normal mood and affect. His behavior is normal. Judgment and thought content normal.   Nursing note and vitals reviewed.    Procedures  Assessment/Plan   Alberto was seen today for  diabetes.    Diagnoses and all orders for this visit:    Other hyperlipidemia  -     Comprehensive Metabolic Panel  -     Lipid Panel  -     Ambulatory Referral to Nutrition Services    Essential hypertension    Morbid obesity due to excess calories    Type 2 diabetes mellitus without complication, with long-term current use of insulin  -     Hemoglobin A1c  -     Ambulatory Referral to Nutrition Services    Gonadotropin deficiency  -     CBC & Differential  -     Testosterone  -     Testosterone Cypionate (DEPOTESTOTERONE CYPIONATE) injection 200 mg; Inject 1 mL into the shoulder, thigh, or buttocks 1 (One) Time.    Elevated PSA  -     PSA    Hyperglycemia   -     CBC & Differential    Other orders  -     phentermine (ADIPEX-P) 37.5 MG tablet; Take 1 tablet by mouth Every Morning Before Breakfast.    The patient has again failed office standard treatment plan.  He has long-standing supportive compliance with a poor diet and poor exercise.  His hemoglobin A1c has markedly risen with 30 point average increase in blood sugars.  I've counseled him on work with the nurse practitioner and dietitian.    The patient has many years of testosterone deficiency and is felt much less fatigue on testosterone.  His PSA and hematocrit indicate good tolerance of this medication.    The patient has moderate cardiovascular risk.  His LDL cholesterol is well managed at 78.  I've asked him to stay on aspirin for primary prevention.    Patient Instructions   1.  Meet with registered dietitian - new weight loss plan and improve control of diabetes.    2.  The with nurse practitioner every month - testosterone shots and diabetic clinic.    3.  Follow a low-calorie diabetic diet - low in salt and low in sugar.    4.  Avoid nighttime eating - except for simple fruits and low calorie foods.    5.  Stationary cycle 20 minutes 3 days weekly - build knee strength and fitness.    6.  Continue usual medicines and supplements - as listed.    7.   Return visit 3 months - fasting checkup.    8.  Hemoglobin A1c is much worse at 8.4%.  Work with diet, exercise, and medications to lower blood sugar levels.    9.  Other laboratory tests are acceptable and require no change in treatment.    Electronically signed Eric Foy M.D.6/20/2017 7:18 PM

## 2017-06-19 NOTE — PATIENT INSTRUCTIONS
1.  Meet with registered dietitian - new weight loss plan and improve control of diabetes.    2.  The with nurse practitioner every month - testosterone shots and diabetic clinic.    3.  Follow a low-calorie diabetic diet - low in salt and low in sugar.    4.  Avoid nighttime eating - except for simple fruits and low calorie foods.    5.  Stationary cycle 20 minutes 3 days weekly - build knee strength and fitness.    6.  Continue usual medicines and supplements - as listed.    7.  Return visit 3 months - fasting checkup.

## 2017-06-20 LAB
ALBUMIN SERPL-MCNC: 4.4 G/DL (ref 3.2–4.8)
ALBUMIN/GLOB SERPL: 1.7 G/DL (ref 1.5–2.5)
ALP SERPL-CCNC: 92 U/L (ref 25–100)
ALT SERPL-CCNC: 36 U/L (ref 7–40)
AST SERPL-CCNC: 35 U/L (ref 0–33)
BASOPHILS # BLD AUTO: 0.02 10*3/MM3 (ref 0–0.2)
BASOPHILS NFR BLD AUTO: 0.2 % (ref 0–1)
BILIRUB SERPL-MCNC: 0.5 MG/DL (ref 0.3–1.2)
BUN SERPL-MCNC: 22 MG/DL (ref 9–23)
BUN/CREAT SERPL: 18.3 (ref 7–25)
CALCIUM SERPL-MCNC: 10.4 MG/DL (ref 8.7–10.4)
CHLORIDE SERPL-SCNC: 102 MMOL/L (ref 99–109)
CHOLEST SERPL-MCNC: 150 MG/DL (ref 0–200)
CO2 SERPL-SCNC: 34 MMOL/L (ref 20–31)
CONV COMMENT: ABNORMAL
CREAT SERPL-MCNC: 1.2 MG/DL (ref 0.6–1.3)
EOSINOPHIL # BLD AUTO: 0.49 10*3/MM3 (ref 0.1–0.3)
EOSINOPHIL NFR BLD AUTO: 5.6 % (ref 0–3)
ERYTHROCYTE [DISTWIDTH] IN BLOOD BY AUTOMATED COUNT: 14.8 % (ref 11.3–14.5)
GLOBULIN SER CALC-MCNC: 2.6 GM/DL
GLUCOSE SERPL-MCNC: 94 MG/DL (ref 70–100)
HBA1C MFR BLD: 8.4 % (ref 4.8–5.6)
HCT VFR BLD AUTO: 38.2 % (ref 38.9–50.9)
HDLC SERPL-MCNC: 34 MG/DL (ref 40–60)
HGB BLD-MCNC: 11.9 G/DL (ref 13.1–17.5)
IMM GRANULOCYTES # BLD: 0.03 10*3/MM3 (ref 0–0.03)
IMM GRANULOCYTES NFR BLD: 0.3 % (ref 0–0.6)
LDLC SERPL CALC-MCNC: 78 MG/DL (ref 0–100)
LYMPHOCYTES # BLD AUTO: 1.96 10*3/MM3 (ref 0.6–4.8)
LYMPHOCYTES NFR BLD AUTO: 22.4 % (ref 24–44)
MCH RBC QN AUTO: 29.9 PG (ref 27–31)
MCHC RBC AUTO-ENTMCNC: 31.2 G/DL (ref 32–36)
MCV RBC AUTO: 96 FL (ref 80–99)
MONOCYTES # BLD AUTO: 0.78 10*3/MM3 (ref 0–1)
MONOCYTES NFR BLD AUTO: 8.9 % (ref 0–12)
NEUTROPHILS # BLD AUTO: 5.48 10*3/MM3 (ref 1.5–8.3)
NEUTROPHILS NFR BLD AUTO: 62.6 % (ref 41–71)
PLATELET # BLD AUTO: 164 10*3/MM3 (ref 150–450)
POTASSIUM SERPL-SCNC: 4.3 MMOL/L (ref 3.5–5.5)
PROT SERPL-MCNC: 7 G/DL (ref 5.7–8.2)
PSA SERPL-MCNC: 3.95 NG/ML (ref 0–4)
RBC # BLD AUTO: 3.98 10*6/MM3 (ref 4.2–5.76)
SODIUM SERPL-SCNC: 144 MMOL/L (ref 132–146)
TESTOST SERPL-MCNC: 276 NG/DL (ref 348–1197)
TRIGL SERPL-MCNC: 191 MG/DL (ref 0–150)
VLDLC SERPL CALC-MCNC: 38.2 MG/DL
WBC # BLD AUTO: 8.76 10*3/MM3 (ref 3.5–10.8)

## 2017-06-22 ENCOUNTER — TELEPHONE (OUTPATIENT)
Dept: INTERNAL MEDICINE | Facility: CLINIC | Age: 72
End: 2017-06-22

## 2017-06-22 NOTE — TELEPHONE ENCOUNTER
Pt called and notified labs all fine except Ha1c up at 8.4 (was 7.4). Pt states he knew it would be higher and plans to go to diabetic clinic every month . Enced to visit with dietician and improve on diet and exercise and continue with medications per TGF.

## 2017-07-19 RX ORDER — FOLIC ACID 1 MG/1
TABLET ORAL
Qty: 180 TABLET | Refills: 1 | Status: SHIPPED | OUTPATIENT
Start: 2017-07-19 | End: 2018-01-16 | Stop reason: SDUPTHER

## 2017-07-28 ENCOUNTER — OFFICE VISIT (OUTPATIENT)
Dept: INTERNAL MEDICINE | Facility: CLINIC | Age: 72
End: 2017-07-28

## 2017-07-28 VITALS
SYSTOLIC BLOOD PRESSURE: 140 MMHG | BODY MASS INDEX: 40.34 KG/M2 | RESPIRATION RATE: 16 BRPM | WEIGHT: 257 LBS | TEMPERATURE: 98.1 F | DIASTOLIC BLOOD PRESSURE: 70 MMHG | HEART RATE: 64 BPM | OXYGEN SATURATION: 97 % | HEIGHT: 67 IN

## 2017-07-28 DIAGNOSIS — E11.9 TYPE 2 DIABETES MELLITUS WITHOUT COMPLICATION, WITH LONG-TERM CURRENT USE OF INSULIN (HCC): ICD-10-CM

## 2017-07-28 DIAGNOSIS — K21.9 GASTROESOPHAGEAL REFLUX DISEASE WITHOUT ESOPHAGITIS: Primary | ICD-10-CM

## 2017-07-28 DIAGNOSIS — E23.0 GONADOTROPIN DEFICIENCY (HCC): ICD-10-CM

## 2017-07-28 DIAGNOSIS — Z79.4 TYPE 2 DIABETES MELLITUS WITHOUT COMPLICATION, WITH LONG-TERM CURRENT USE OF INSULIN (HCC): ICD-10-CM

## 2017-07-28 DIAGNOSIS — E66.01 MORBID OBESITY DUE TO EXCESS CALORIES (HCC): ICD-10-CM

## 2017-07-28 PROCEDURE — 96372 THER/PROPH/DIAG INJ SC/IM: CPT | Performed by: NURSE PRACTITIONER

## 2017-07-28 PROCEDURE — 99213 OFFICE O/P EST LOW 20 MIN: CPT | Performed by: NURSE PRACTITIONER

## 2017-07-28 RX ADMIN — TESTOSTERONE CYPIONATE 200 MG: 200 INJECTION, SOLUTION INTRAMUSCULAR at 12:00

## 2017-07-28 NOTE — PROGRESS NOTES
Subjective   Alberto Bailey is a 71 y.o. male.     History of Present Illness     1. Diabetes: Pt's diabetic control has declined this Summer with HA1c of 8.4 one month ago. He reports he has decreased his snacking at night and fasting glucose has gone from 140-170 to . Before meals his glucose has averaged 120. He is taking Lantus 80 u at bedtime, giving Novolog before each meal, taking Janumet 50/1000 mg BID.    2. Obesity: Pt has gained 1# since his last visit 5 weeks ago. He admits to having little personal motivation to follow his diet and exercise program. He knows he should exercise daily, but [facility] Currently he is doing no exercise. He was referred to the dietitian last month, but did not return their calls or letter to schedule an appt. He does not think going to dietitian will help him stay on his diet. Yet he feels he needs accountability to maintain his health care plan. He continues Phentermine 37.5mg daily and Topiramate 50 mg QAM and 100 mg QHS. He feels these meds have curbed his appetite, but he continues to eat from boredom.     3. GERD: Pt reports he has been waking up in the early mornings with heartburn. He has not been taking his Ranitidine 150 mg as on med list. He has been snacking late at night.     The following portions of the patient's history were reviewed and updated as appropriate: allergies, current medications, past family history, past medical history, past social history, past surgical history and problem list.    Review of Systems   Constitutional: Negative for fatigue and fever.   HENT: Negative for congestion, ear pain and sore throat.    Eyes: Negative for pain and itching.   Respiratory: Negative for cough and shortness of breath.    Cardiovascular: Positive for leg swelling (mild intermittent). Negative for chest pain and palpitations.   Gastrointestinal: Negative for abdominal pain and nausea.   Endocrine: Negative for cold intolerance  "and heat intolerance.   Genitourinary: Negative for dysuria and hematuria.   Musculoskeletal: Positive for arthralgias (knees and right shoulder with certain movements - chronic pain). Negative for back pain.   Skin: Negative for rash and wound.   Allergic/Immunologic: Negative for environmental allergies and food allergies.   Neurological: Negative for dizziness, seizures, syncope and headaches.   Hematological: Negative for adenopathy. Does not bruise/bleed easily.   Psychiatric/Behavioral: Negative for sleep disturbance. The patient is not nervous/anxious.        Objective   Blood pressure 140/70, pulse 64, temperature 98.1 °F (36.7 °C), temperature source Oral, resp. rate 16, height 67\" (170.2 cm), weight 257 lb (117 kg), SpO2 97 %.    Physical Exam   Constitutional: He is oriented to person, place, and time. He appears well-developed and well-nourished.   HENT:   Mouth/Throat: Oropharynx is clear and moist.   Cardiovascular: Normal rate, regular rhythm and normal heart sounds.    No murmur heard.  Mild non-pitting edema lower legs   Pulmonary/Chest: Effort normal and breath sounds normal. He has no wheezes.   Abdominal: Soft. Bowel sounds are normal. There is no tenderness.   Neurological: He is alert and oriented to person, place, and time.   Skin: Skin is warm and dry.   Psychiatric: He has a normal mood and affect. His behavior is normal.   Vitals reviewed.    Procedures  Assessment/Plan   Alberto was seen today for diabetes.    Diagnoses and all orders for this visit:    Gastroesophageal reflux disease without esophagitis    Morbid obesity due to excess calories  -     Ambulatory Referral to Nutrition Services    Type 2 diabetes mellitus without complication, with long-term current use of insulin  -     Ambulatory Referral to Nutrition Services    Gonadotropin deficiency      1. Diabetes: Pt has had poor diabetic control this Summer. He was counseled in depth about changes in lifestyle and habits needed to " make an impact on his diabetic control. He agrees to work with the dietitian every month, alternating with appts in this office - referral made to schedule appts on [facility]. Pt is to change evening snacking to be one carbmaster yogurt with one piece of fruit. He is to decrease daily calories by 250 and decrease carbs. He is to return to [facility] 4 times/week; to look into doing [employer] program and cycling for 30 minutes each visit; use light 2-5# weights for upper body strengthening. He was encouraged to work with  for gentle exercise program. He was instructed to divide daily food intake into 3 meals, giving Novolog according to sliding scale before each meal; continue other meds.     2. Obesity: Pt is to continue current doses of Topiramate and Phentermine. As above he is to make improvements in diet and exercise program.     3. GERD: Pt is to resume Ranitidine 150 mg before supper. He was counseled that nighttime snacking can increase risk of GERD. He is to avoid eating for 2-3 hours before bed. Was given written instructions on lifestyle changes.     4. Testosterone injection was given today.    Continue other meds.    Non-fasting f/u in 1 month.     Patient Instructions   1. To be scheduled with dietitian in 2 weeks. Alternate visits with dietitian and nurse practitioner every 2 weeks.    2. Decrease daily calories by 250; cut back on carbs.     3. Start daily exercise program. Go to gym at least 4 times/week. Consider getting stationary bike for home use. Cycle 30 minutes daily - may to in intervals. Consider Silver Sneakers program at the [facility]. Use light 2-3# weights for upper body strengthening.    4. Eat 3 meals/day. Consider packing lunch for work. Limit snacking at night to apple, pear, berries, carbmaster yogurt.     5. Take Ranitidine 150 mg before supper daily.    6. Avoid large meals, spicy/adidic foods, chocolate, peppermint, alcohol, carbonated beverages, tobacco. Sit up 2-3 hours  after eating meals. Sleep with head of bed elevated or on wedge pillow.     7. Give Novolog before each meal according to sliding scale.    8. Avoid salt in diet.    9. Continue other meds.    10. Testosterone injection was given today.    11. Non-fasting f/u in 1 month.                Electronically signed by ALIREZA Pichardo 7/28/2017 1:04 PM

## 2017-07-28 NOTE — PATIENT INSTRUCTIONS
1. To be scheduled with dietitian in 2 weeks. Alternate visits with dietitian and nurse practitioner every 2 weeks.    2. Decrease daily calories by 250; cut back on carbs.     3. Start daily exercise program. Go to gym at least 4 times/week. Consider getting stationary bike for home use. Cycle 30 minutes daily - may to in intervals. Consider Silver Sneakers program at the [facility]. Use light 2-3# weights for upper body strengthening.    4. Eat 3 meals/day. Consider packing lunch for work. Limit snacking at night to apple, pear, berries, carbmaster yogurt.     5. Take Ranitidine 150 mg before supper daily.    6. Avoid large meals, spicy/adidic foods, chocolate, peppermint, alcohol, carbonated beverages, tobacco. Sit up 2-3 hours after eating meals. Sleep with head of bed elevated or on wedge pillow.     7. Give Novolog before each meal according to sliding scale.    8. Avoid salt in diet.    9. Continue other meds.    10. Testosterone injection was given today.    11. Non-fasting f/u in 1 month.

## 2017-08-01 DIAGNOSIS — R60.1 GENERALIZED EDEMA: ICD-10-CM

## 2017-08-01 RX ORDER — FUROSEMIDE 20 MG/1
TABLET ORAL
Qty: 90 TABLET | Refills: 1 | Status: SHIPPED | OUTPATIENT
Start: 2017-08-01 | End: 2018-02-04 | Stop reason: SDUPTHER

## 2017-08-06 DIAGNOSIS — E23.0 GONADOTROPIN DEFICIENCY (HCC): Primary | ICD-10-CM

## 2017-08-06 RX ORDER — TESTOSTERONE CYPIONATE 200 MG/ML
200 INJECTION, SOLUTION INTRAMUSCULAR ONCE
Status: COMPLETED | OUTPATIENT
Start: 2017-08-06 | End: 2017-07-28

## 2017-08-11 RX ORDER — TERAZOSIN 10 MG/1
CAPSULE ORAL
Qty: 90 CAPSULE | Refills: 1 | Status: SHIPPED | OUTPATIENT
Start: 2017-08-11 | End: 2018-02-09 | Stop reason: SDUPTHER

## 2017-08-25 ENCOUNTER — OFFICE VISIT (OUTPATIENT)
Dept: INTERNAL MEDICINE | Facility: CLINIC | Age: 72
End: 2017-08-25

## 2017-08-25 VITALS
BODY MASS INDEX: 40.91 KG/M2 | WEIGHT: 261.2 LBS | HEART RATE: 90 BPM | DIASTOLIC BLOOD PRESSURE: 68 MMHG | TEMPERATURE: 98.5 F | OXYGEN SATURATION: 95 % | RESPIRATION RATE: 20 BRPM | SYSTOLIC BLOOD PRESSURE: 120 MMHG

## 2017-08-25 DIAGNOSIS — E66.01 MORBID OBESITY DUE TO EXCESS CALORIES (HCC): Primary | ICD-10-CM

## 2017-08-25 DIAGNOSIS — E11.9 TYPE 2 DIABETES MELLITUS WITHOUT COMPLICATION, WITH LONG-TERM CURRENT USE OF INSULIN (HCC): ICD-10-CM

## 2017-08-25 DIAGNOSIS — E23.0 GONADOTROPIN DEFICIENCY (HCC): ICD-10-CM

## 2017-08-25 DIAGNOSIS — Z79.4 TYPE 2 DIABETES MELLITUS WITHOUT COMPLICATION, WITH LONG-TERM CURRENT USE OF INSULIN (HCC): ICD-10-CM

## 2017-08-25 LAB — HBA1C MFR BLD: 8.2 % (ref 4.8–5.6)

## 2017-08-25 PROCEDURE — 96372 THER/PROPH/DIAG INJ SC/IM: CPT | Performed by: NURSE PRACTITIONER

## 2017-08-25 PROCEDURE — 99213 OFFICE O/P EST LOW 20 MIN: CPT | Performed by: NURSE PRACTITIONER

## 2017-08-25 RX ORDER — TOPIRAMATE 100 MG/1
100 TABLET, FILM COATED ORAL
Qty: 90 TABLET | Refills: 1 | Status: SHIPPED | OUTPATIENT
Start: 2017-08-25 | End: 2018-03-02 | Stop reason: SDUPTHER

## 2017-08-25 RX ORDER — TESTOSTERONE CYPIONATE 200 MG/ML
200 INJECTION, SOLUTION INTRAMUSCULAR ONCE
Status: COMPLETED | OUTPATIENT
Start: 2017-08-25 | End: 2017-08-25

## 2017-08-25 RX ADMIN — TESTOSTERONE CYPIONATE 200 MG: 200 INJECTION, SOLUTION INTRAMUSCULAR at 09:00

## 2017-08-25 NOTE — PROGRESS NOTES
Subjective   Alberto Bailey is a 71 y.o. male.     History of Present Illness     1. Diabetes: Pt's HA1c venancio to 8.4 in June. At that time he agreed to return to dietitian and improve on exercise but these have not been done. On July 28 the visit plan was for him to decrease daily Calories by 250, cut back on evening snacking, and get to the gym at least 3 days/week. Also to test glucose before each meal and give sliding scale according to directions. He currently takes Lantus 80 u at bedtime, Janumet 50/1000 mg BID, and Novolog 6u before each meal without testing glucose. Reports fasting glucose has been running 150-174; does not test later in the day.    2. Obesity: Pt has gained 4# in the past month, with current BMI of 40. He continues Topiramate 50 mg QAM and 100mg before supper with Phentermine 37.5 mg QAM.     3. Here for monthly Testosterone injection today.    The following portions of the patient's history were reviewed and updated as appropriate: allergies, current medications, past family history, past medical history, past social history, past surgical history and problem list.    Review of Systems   Constitutional: Negative for fatigue and fever.   HENT: Negative for congestion and sore throat.    Respiratory: Negative for cough and shortness of breath.    Cardiovascular: Negative for chest pain and palpitations.   Gastrointestinal: Negative for abdominal pain and nausea.   Genitourinary: Negative for dysuria and hematuria.   Musculoskeletal: Negative for arthralgias and back pain.   Skin: Negative for rash and wound.   Neurological: Negative for dizziness, syncope and headaches.   Hematological: Negative for adenopathy. Does not bruise/bleed easily.   Psychiatric/Behavioral: Negative for dysphoric mood and sleep disturbance. The patient is not nervous/anxious.        Objective   Blood pressure 120/68, pulse 90, temperature 98.5 °F (36.9 °C), temperature source Oral, resp. rate 20, weight 261 lb 3.2 oz  (118 kg), SpO2 95 %.    Physical Exam   Constitutional: He appears well-developed and well-nourished.   HENT:   Right Ear: Tympanic membrane and ear canal normal.   Left Ear: Tympanic membrane and ear canal normal.   Mouth/Throat: Oropharynx is clear and moist.   Cardiovascular: Normal rate, regular rhythm and normal heart sounds.    No murmur heard.  No edema   Pulmonary/Chest: Effort normal and breath sounds normal.   Abdominal: Soft. Normal appearance and bowel sounds are normal. There is no hepatosplenomegaly. There is no tenderness.   Skin: Skin is warm and dry.   Psychiatric: He has a normal mood and affect. His behavior is normal.   Vitals reviewed.    Procedures  Assessment/Plan   Alberto was seen today for diabetes.    Diagnoses and all orders for this visit:    Morbid obesity due to excess calories    Type 2 diabetes mellitus without complication, with long-term current use of insulin  -     Hemoglobin A1c    Gonadotropin deficiency    1. Diabetes: Pt's diabetic control was poor in June. Will repat HA1c today. He agreed to work closely with the dietitian this Summer and Fall to develop improved diet and eating pattern. He also agreed to work closely with the  at [facility] and exercise there at least 3 days/week.     2. Obesity: Pt would benefit in many ways with 30-50# weight loss over the next year. He is to continue Topiramate and Phentermine, but as above needs to improve his diet and exercise program.    3. Testosterone injection given today.    Continue other meds.    Pt reminded that Prevnar vaccine, Shingles vaccine and Eye exam are all due.     Wellness and fasting f/u in 1 month.    Patient Instructions   1. HA1c to be done today.    2. Return to dietitian and work closely with dietitian for changes in habits.    3. Start working with  at [facility] - at least 4 days/week.    4. Eye exam due - have ophthalmologist fax note to this office.    5. Prevnar vaccine due. Shingles vaccine  due.    6. Continue current meds. Testosterone injection in 1 month.    7. Wellness and fasting exam in 1-2 months.

## 2017-08-25 NOTE — PATIENT INSTRUCTIONS
1. HA1c to be done today.    2. Return to dietitian and work closely with dietitian for changes in habits.    3. Start working with  at [facility] - at least 4 days/week.    4. Eye exam due - have ophthalmologist fax note to this office.    5. Prevnar vaccine due. Shingles vaccine due.    6. Continue current meds. Testosterone injection in 1 month.    7. Wellness and fasting exam in 1-2 months.

## 2017-08-28 ENCOUNTER — TELEPHONE (OUTPATIENT)
Dept: INTERNAL MEDICINE | Facility: CLINIC | Age: 72
End: 2017-08-28

## 2017-08-28 DIAGNOSIS — Z79.4 TYPE 2 DIABETES MELLITUS WITHOUT COMPLICATION, WITH LONG-TERM CURRENT USE OF INSULIN (HCC): Primary | ICD-10-CM

## 2017-08-28 DIAGNOSIS — E11.9 TYPE 2 DIABETES MELLITUS WITHOUT COMPLICATION, WITH LONG-TERM CURRENT USE OF INSULIN (HCC): Primary | ICD-10-CM

## 2017-08-28 NOTE — TELEPHONE ENCOUNTER
Pt notified HA1c down slightly but remains elevated at 8.2. Advised to try Victoza; he was counseled about benefits and risks. Will start Victoza 0.6 mg daily; he is to call with glucose readings in 2 weeks. He was also instructed to start testing his glucose before meals and give Novolog according to sliding scale.

## 2017-09-18 RX ORDER — INSULIN ASPART 100 [IU]/ML
INJECTION, SOLUTION INTRAVENOUS; SUBCUTANEOUS
Refills: 2 | OUTPATIENT
Start: 2017-09-18

## 2017-09-25 ENCOUNTER — OFFICE VISIT (OUTPATIENT)
Dept: INTERNAL MEDICINE | Facility: CLINIC | Age: 72
End: 2017-09-25

## 2017-09-25 VITALS
HEART RATE: 56 BPM | WEIGHT: 264 LBS | OXYGEN SATURATION: 97 % | DIASTOLIC BLOOD PRESSURE: 86 MMHG | SYSTOLIC BLOOD PRESSURE: 136 MMHG | RESPIRATION RATE: 16 BRPM | BODY MASS INDEX: 41.35 KG/M2 | TEMPERATURE: 96.6 F

## 2017-09-25 DIAGNOSIS — E11.9 TYPE 2 DIABETES MELLITUS WITHOUT COMPLICATION, WITH LONG-TERM CURRENT USE OF INSULIN (HCC): ICD-10-CM

## 2017-09-25 DIAGNOSIS — Z79.4 TYPE 2 DIABETES MELLITUS WITHOUT COMPLICATION, WITH LONG-TERM CURRENT USE OF INSULIN (HCC): ICD-10-CM

## 2017-09-25 DIAGNOSIS — R60.1 GENERALIZED EDEMA: ICD-10-CM

## 2017-09-25 DIAGNOSIS — E23.0 GONADOTROPIN DEFICIENCY (HCC): ICD-10-CM

## 2017-09-25 DIAGNOSIS — E66.01 MORBID OBESITY DUE TO EXCESS CALORIES (HCC): ICD-10-CM

## 2017-09-25 DIAGNOSIS — I10 ESSENTIAL HYPERTENSION: Primary | ICD-10-CM

## 2017-09-25 PROCEDURE — 99214 OFFICE O/P EST MOD 30 MIN: CPT | Performed by: INTERNAL MEDICINE

## 2017-09-25 PROCEDURE — 96372 THER/PROPH/DIAG INJ SC/IM: CPT | Performed by: INTERNAL MEDICINE

## 2017-09-25 RX ORDER — TESTOSTERONE CYPIONATE 200 MG/ML
200 INJECTION, SOLUTION INTRAMUSCULAR ONCE
Status: COMPLETED | OUTPATIENT
Start: 2017-09-25 | End: 2017-09-25

## 2017-09-25 RX ADMIN — TESTOSTERONE CYPIONATE 200 MG: 200 INJECTION, SOLUTION INTRAMUSCULAR at 12:25

## 2017-09-25 NOTE — PATIENT INSTRUCTIONS
1.  Start Victoza 0.6 MG every morning - for better diabetic control.    2.  Call the office in one month - for status report.    3.  Go to [facility] 3 days weekly - stationary cycle 20 minutes per visit.    4.  Follow a low-calorie diabetic diet - low in salt and low in sugar.    5.  Return visit October 1 - wellness exam --- and early November - fasting checkup.

## 2017-09-25 NOTE — PROGRESS NOTES
Subjective   Alberto Bailey is a 71 y.o. male.     History of Present Illness     The patient's had enrolled history of severe obesity.  He is undergone extensive dietary counseling and used repeated appetite suppressants.  His weight has tended to fluctuate and is been as high as 300 pounds.  He often just gets off cycle with his eating and uses no self discipline.  He has turned diabetic and is now struggling to keep his diabetes under control on insulin.     The following portions of the patient's history were reviewed and updated as appropriate: allergies, current medications, past family history, past medical history, past social history, past surgical history and problem list.    Review of Systems   Constitutional: Negative for appetite change and fatigue.   HENT: Negative for ear pain and sore throat.    Respiratory: Negative for cough and shortness of breath.    Cardiovascular: Positive for leg swelling. Negative for chest pain and palpitations.   Gastrointestinal: Negative for abdominal pain and nausea.   Musculoskeletal: Positive for arthralgias and gait problem. Negative for back pain.        Moderate persistent knee pain limits walking   Neurological: Negative for dizziness and headaches.       Objective   Blood pressure 136/86, pulse 56, temperature 96.6 °F (35.9 °C), temperature source Oral, resp. rate 16, weight 264 lb (120 kg), SpO2 97 %.    Physical Exam   Constitutional: He is oriented to person, place, and time. He appears well-developed and well-nourished. No distress.   Cardiovascular: Normal rate, regular rhythm and normal heart sounds.    Pulmonary/Chest: Effort normal and breath sounds normal. He has no wheezes. He has no rales.   Abdominal: Soft. Bowel sounds are normal. He exhibits no distension. There is no tenderness.   Severely obese   Musculoskeletal: Normal range of motion.   Moderate stiffness of knees with mild tenderness.  There is 1+ ankle edema.   Neurological: He is alert and  oriented to person, place, and time. He exhibits normal muscle tone. Coordination normal.   Psychiatric: He has a normal mood and affect. His behavior is normal. Judgment and thought content normal.   Nursing note and vitals reviewed.    Procedures  Assessment/Plan   Alberto was seen today for obesity.    Diagnoses and all orders for this visit:    Essential hypertension    Type 2 diabetes mellitus without complication, with long-term current use of insulin    Morbid obesity due to excess calories    Generalized edema    Gonadotropin deficiency  -     Testosterone Cypionate (DEPOTESTOTERONE CYPIONATE) injection 200 mg; Inject 1 mL into the shoulder, thigh, or buttocks 1 (One) Time.    The patient has gained weight steadily in recent months.  I've counseled him extensively on Victoza and asked him to start this at low dose until his next visit.  He apparently has been reading the side effects and is worried about pancreatitis.    The patient's blood pressure was adequate control on current medication.  He should continue on salt restriction and stationary cycle 3 days a week at the [facility].    The patient has severe type 2 diabetes which is insulin requiring.  He is hemoglobin A1c was 8.2 in August.  Victoza gives him an excellent opportunity to improve diabetic control as well as his obesity.    The patient has moderate recurring obesity.  He has using furosemide on a daily basis.  Salt restriction and weight loss will also be helpful.    Patient Instructions   1.  Start Victoza 0.6 MG every morning - for better diabetic control.    2.  Call the office in one month - for status report.    3.  Go to [facility] 3 days weekly - stationary cycle 20 minutes per visit.    4.  Follow a low-calorie diabetic diet - low in salt and low in sugar.    5.  Return visit October 1 - wellness exam --- and early November - fasting checkup.    Electronically signed Eric Foy M.D.9/26/2017 7:04 PM

## 2017-10-09 RX ORDER — SITAGLIPTIN AND METFORMIN HYDROCHLORIDE 1000; 50 MG/1; MG/1
TABLET, FILM COATED ORAL
Qty: 180 TABLET | Refills: 1 | Status: SHIPPED | OUTPATIENT
Start: 2017-10-09 | End: 2018-04-08 | Stop reason: SDUPTHER

## 2017-10-09 RX ORDER — LEVOTHYROXINE SODIUM 0.12 MG/1
TABLET ORAL
Qty: 90 TABLET | Refills: 1 | Status: SHIPPED | OUTPATIENT
Start: 2017-10-09 | End: 2018-04-08 | Stop reason: SDUPTHER

## 2017-10-09 RX ORDER — RAMIPRIL 10 MG/1
CAPSULE ORAL
Qty: 180 CAPSULE | Refills: 1 | Status: SHIPPED | OUTPATIENT
Start: 2017-10-09 | End: 2018-04-08 | Stop reason: SDUPTHER

## 2017-10-16 RX ORDER — ALLOPURINOL 300 MG/1
TABLET ORAL
Qty: 90 TABLET | Refills: 1 | Status: SHIPPED | OUTPATIENT
Start: 2017-10-16 | End: 2018-01-01 | Stop reason: SDUPTHER

## 2017-10-18 RX ORDER — BLOOD SUGAR DIAGNOSTIC
STRIP MISCELLANEOUS
Qty: 200 EACH | Refills: 2 | Status: SHIPPED | OUTPATIENT
Start: 2017-10-18

## 2017-10-24 RX ORDER — LOVASTATIN 20 MG/1
TABLET ORAL
Qty: 90 TABLET | Refills: 1 | Status: SHIPPED | OUTPATIENT
Start: 2017-10-24 | End: 2018-01-01 | Stop reason: SDUPTHER

## 2017-11-01 ENCOUNTER — OFFICE VISIT (OUTPATIENT)
Dept: INTERNAL MEDICINE | Facility: CLINIC | Age: 72
End: 2017-11-01

## 2017-11-01 ENCOUNTER — LAB REQUISITION (OUTPATIENT)
Dept: LAB | Facility: HOSPITAL | Age: 72
End: 2017-11-01

## 2017-11-01 VITALS
TEMPERATURE: 96.9 F | OXYGEN SATURATION: 95 % | BODY MASS INDEX: 41.12 KG/M2 | HEIGHT: 67 IN | RESPIRATION RATE: 18 BRPM | DIASTOLIC BLOOD PRESSURE: 60 MMHG | HEART RATE: 76 BPM | SYSTOLIC BLOOD PRESSURE: 128 MMHG | WEIGHT: 262 LBS

## 2017-11-01 DIAGNOSIS — D64.9 ANEMIA, UNSPECIFIED TYPE: ICD-10-CM

## 2017-11-01 DIAGNOSIS — Z79.4 TYPE 2 DIABETES MELLITUS WITHOUT COMPLICATION, WITH LONG-TERM CURRENT USE OF INSULIN (HCC): ICD-10-CM

## 2017-11-01 DIAGNOSIS — Z23 FLU VACCINE NEED: Primary | ICD-10-CM

## 2017-11-01 DIAGNOSIS — I10 ESSENTIAL HYPERTENSION: ICD-10-CM

## 2017-11-01 DIAGNOSIS — E78.49 OTHER HYPERLIPIDEMIA: ICD-10-CM

## 2017-11-01 DIAGNOSIS — E11.9 TYPE 2 DIABETES MELLITUS WITHOUT COMPLICATION, WITH LONG-TERM CURRENT USE OF INSULIN (HCC): ICD-10-CM

## 2017-11-01 DIAGNOSIS — E66.01 MORBID OBESITY (HCC): ICD-10-CM

## 2017-11-01 DIAGNOSIS — Z00.00 ROUTINE GENERAL MEDICAL EXAMINATION AT A HEALTH CARE FACILITY: ICD-10-CM

## 2017-11-01 DIAGNOSIS — E03.4 HYPOTHYROIDISM DUE TO ACQUIRED ATROPHY OF THYROID: ICD-10-CM

## 2017-11-01 LAB
ALBUMIN SERPL-MCNC: 4.6 G/DL (ref 3.2–4.8)
ALBUMIN/GLOB SERPL: 2.1 G/DL (ref 1.5–2.5)
ALP SERPL-CCNC: 87 U/L (ref 25–100)
ALT SERPL-CCNC: 39 U/L (ref 7–40)
AST SERPL-CCNC: 39 U/L (ref 0–33)
BASOPHILS # BLD AUTO: 0.03 10*3/MM3 (ref 0–0.2)
BASOPHILS NFR BLD AUTO: 0.3 % (ref 0–1)
BILIRUB SERPL-MCNC: 0.5 MG/DL (ref 0.3–1.2)
BUN SERPL-MCNC: 31 MG/DL (ref 9–23)
BUN/CREAT SERPL: 23.8 (ref 7–25)
CALCIUM SERPL-MCNC: 10.1 MG/DL (ref 8.7–10.4)
CHLORIDE SERPL-SCNC: 103 MMOL/L (ref 99–109)
CHOLEST SERPL-MCNC: 118 MG/DL (ref 0–200)
CO2 SERPL-SCNC: 30 MMOL/L (ref 20–31)
CREAT SERPL-MCNC: 1.3 MG/DL (ref 0.6–1.3)
CRP SERPL-MCNC: 0.46 MG/DL (ref 0–1)
EOSINOPHIL # BLD AUTO: 0.39 10*3/MM3 (ref 0–0.3)
EOSINOPHIL NFR BLD AUTO: 4.2 % (ref 0–3)
ERYTHROCYTE [DISTWIDTH] IN BLOOD BY AUTOMATED COUNT: 14.5 % (ref 11.3–14.5)
GFR SERPLBLD CREATININE-BSD FMLA CKD-EPI: 54 ML/MIN/1.73
GFR SERPLBLD CREATININE-BSD FMLA CKD-EPI: 66 ML/MIN/1.73
GLOBULIN SER CALC-MCNC: 2.2 GM/DL
GLUCOSE SERPL-MCNC: 78 MG/DL (ref 70–100)
HBA1C MFR BLD: 8 % (ref 4.8–5.6)
HCT VFR BLD AUTO: 38.8 % (ref 38.9–50.9)
HDLC SERPL-MCNC: 36 MG/DL (ref 40–60)
HGB BLD-MCNC: 12.3 G/DL (ref 13.1–17.5)
IMM GRANULOCYTES # BLD: 0.04 10*3/MM3 (ref 0–0.03)
IMM GRANULOCYTES NFR BLD: 0.4 % (ref 0–0.6)
LDLC SERPL CALC-MCNC: 53 MG/DL (ref 0–100)
LYMPHOCYTES # BLD AUTO: 2.39 10*3/MM3 (ref 0.6–4.8)
LYMPHOCYTES NFR BLD AUTO: 25.8 % (ref 24–44)
MCH RBC QN AUTO: 30.3 PG (ref 27–31)
MCHC RBC AUTO-ENTMCNC: 31.7 G/DL (ref 32–36)
MCV RBC AUTO: 95.6 FL (ref 80–99)
MONOCYTES # BLD AUTO: 0.7 10*3/MM3 (ref 0–1)
MONOCYTES NFR BLD AUTO: 7.5 % (ref 0–12)
NEUTROPHILS # BLD AUTO: 5.73 10*3/MM3 (ref 1.5–8.3)
NEUTROPHILS NFR BLD AUTO: 61.8 % (ref 41–71)
PLATELET # BLD AUTO: 172 10*3/MM3 (ref 150–450)
POTASSIUM SERPL-SCNC: 4.2 MMOL/L (ref 3.5–5.5)
PROT SERPL-MCNC: 6.8 G/DL (ref 5.7–8.2)
RBC # BLD AUTO: 4.06 10*6/MM3 (ref 4.2–5.76)
RETICS/RBC NFR AUTO: 1.77 % (ref 0.5–1.5)
SODIUM SERPL-SCNC: 142 MMOL/L (ref 132–146)
TRIGL SERPL-MCNC: 145 MG/DL (ref 0–150)
VLDLC SERPL CALC-MCNC: 29 MG/DL
WBC # BLD AUTO: 9.28 10*3/MM3 (ref 3.5–10.8)

## 2017-11-01 PROCEDURE — 90662 IIV NO PRSV INCREASED AG IM: CPT | Performed by: INTERNAL MEDICINE

## 2017-11-01 PROCEDURE — G0439 PPPS, SUBSEQ VISIT: HCPCS | Performed by: INTERNAL MEDICINE

## 2017-11-01 PROCEDURE — G0008 ADMIN INFLUENZA VIRUS VAC: HCPCS | Performed by: INTERNAL MEDICINE

## 2017-11-01 PROCEDURE — 96372 THER/PROPH/DIAG INJ SC/IM: CPT | Performed by: INTERNAL MEDICINE

## 2017-11-01 PROCEDURE — 96160 PT-FOCUSED HLTH RISK ASSMT: CPT | Performed by: INTERNAL MEDICINE

## 2017-11-01 PROCEDURE — 36415 COLL VENOUS BLD VENIPUNCTURE: CPT | Performed by: INTERNAL MEDICINE

## 2017-11-01 PROCEDURE — 99213 OFFICE O/P EST LOW 20 MIN: CPT | Performed by: INTERNAL MEDICINE

## 2017-11-01 RX ORDER — TESTOSTERONE CYPIONATE 200 MG/ML
200 INJECTION, SOLUTION INTRAMUSCULAR ONCE
Status: COMPLETED | OUTPATIENT
Start: 2017-11-01 | End: 2017-11-01

## 2017-11-01 RX ADMIN — TESTOSTERONE CYPIONATE 200 MG: 200 INJECTION, SOLUTION INTRAMUSCULAR at 11:34

## 2017-11-01 NOTE — PROGRESS NOTES
QUICK REFERENCE INFORMATION:  The ABCs of the Annual Wellness Visit    Subsequent Medicare Wellness Visit    HEALTH RISK ASSESSMENT    1945    Recent Hospitalizations:  No hospitalization(s) within the last year..        Current Medical Providers:  Patient Care Team:  Eric Foy MD as PCP - General        Smoking Status:  History   Smoking Status   • Never Smoker   Smokeless Tobacco   • Never Used       Alcohol Consumption:  History   Alcohol Use No       Depression Screen:   No flowsheet data found.    Health Habits and Functional and Cognitive Screening:  Functional & Cognitive Status 11/1/2017   Do you have difficulty preparing food and eating? No   Do you have difficulty bathing yourself, getting dressed or grooming yourself? No   Do you have difficulty using the toilet? No   Do you have difficulty moving around from place to place? No   Do you have trouble with steps or getting out of a bed or a chair? No   In the past year have you fallen or experienced a near fall? No   Current Diet Diabetic Diet   Dental Exam Not up to date   Eye Exam Not up to date   Exercise (times per week) 2 times per week   Current Exercise Activities Include Stationary Bicycling/Spin Class   Do you need help using the phone?  No   Are you deaf or do you have serious difficulty hearing?  No   Do you need help with transportation? No   Do you need help shopping? No   Do you need help preparing meals?  No   Do you need help with housework?  No   Do you need help with laundry? No   Do you need help taking your medications? No   Do you need help managing money? No   Have you felt unusual stress, anger or loneliness in the last month? No   Who do you live with? Alone   If you need help, do you have trouble finding someone available to you? No   Have you been bothered in the last four weeks by sexual problems? No   Do you have difficulty concentrating, remembering or making decisions? No           Does the patient have evidence  of cognitive impairment? No    Aspirin use counseling: Start ASA 81 mg daily       Recent Lab Results:  CMP:  Lab Results   Component Value Date    GLU 94 06/19/2017    BUN 22 06/19/2017    CREATININE 1.20 06/19/2017    EGFRIFNONA 60 (L) 06/19/2017    EGFRIFAFRI 73 06/19/2017    BCR 18.3 06/19/2017     06/19/2017    K 4.3 06/19/2017    CO2 34.0 (H) 06/19/2017    CALCIUM 10.4 06/19/2017    PROTENTOTREF 7.0 06/19/2017    ALBUMIN 4.40 06/19/2017    LABGLOBREF 2.6 06/19/2017    LABIL2 1.7 06/19/2017    BILITOT 0.5 06/19/2017    ALKPHOS 92 06/19/2017    AST 35 (H) 06/19/2017    ALT 36 06/19/2017     Lipid Panel:  Lab Results   Component Value Date    CHOL 103 08/19/2016    TRIG 191 (H) 06/19/2017    HDL 34 (L) 06/19/2017    VLDL 38.2 06/19/2017    LDLDIRECT 51 08/19/2016    LDLHDL 1.48 08/19/2016     HbA1c:  Lab Results   Component Value Date    HGBA1C 8.20 (H) 08/25/2017       Visual Acuity:  No exam data present    Age-appropriate Screening Schedule:  Refer to the list below for future screening recommendations based on patient's age, sex and/or medical conditions. Orders for these recommended tests are listed in the plan section. The patient has been provided with a written plan.    Health Maintenance   Topic Date Due   • PNEUMOCOCCAL VACCINES (65+ LOW/MEDIUM RISK) (1 of 2 - PCV13) 10/14/2011   • ZOSTER VACCINE  04/27/2016   • DIABETIC EYE EXAM  05/02/2016   • COLONOSCOPY  05/02/2016   • INFLUENZA VACCINE  08/01/2017   • DIABETIC FOOT EXAM  12/02/2017   • URINE MICROALBUMIN  12/02/2017   • HEMOGLOBIN A1C  02/25/2018   • LIPID PANEL  06/19/2018   • TDAP/TD VACCINES (2 - Td) 03/19/2022        Subjective   History of Present Illness    Alberto Bailey is a 72 y.o. male who presents for an Subsequent Wellness Visit.    The following portions of the patient's history were reviewed and updated as appropriate: allergies, current medications, past family history, past medical history, past social history, past surgical  history and problem list.    Outpatient Medications Prior to Visit   Medication Sig Dispense Refill   • ACCU-CHEK SERENITY PLUS test strip TEST 4 TIMES DAILY 200 each 2   • acetaminophen (TYLENOL) 500 MG tablet Take 2 tablets by mouth as needed.     • albuterol (PROVENTIL HFA;VENTOLIN HFA) 108 (90 BASE) MCG/ACT inhaler Inhale 1 puff as needed.     • allopurinol (ZYLOPRIM) 300 MG tablet TAKE 1 TABLET BY MOUTH DAILY. 90 tablet 1   • aspirin 81 MG tablet Take 1 tablet by mouth every morning.     • atenolol (TENORMIN) 25 MG tablet TAKE 1 TABLET BY MOUTH EVERY MORNING 90 tablet 1   • buPROPion XL (WELLBUTRIN XL) 150 MG 24 hr tablet TAKE 1 TABLET EVERY MORNING 90 tablet 1   • Cholecalciferol (VITAMIN D) 1000 UNITS tablet Take 3 tablets by mouth daily.     • fluticasone (FLONASE) 50 MCG/ACT nasal spray 1 spray into each nostril daily as needed.     • folic acid (FOLVITE) 1 MG tablet TAKE 2 TABLETS EVERY  tablet 1   • furosemide (LASIX) 20 MG tablet TAKE 1 TABLET BY MOUTH DAILY. 90 tablet 1   • insulin aspart (NOVOLOG FLEXPEN) 100 UNIT/ML solution pen-injector sc pen INJECT 6-12 UNITS 3 TIMES A DAY AS DIRECTED SLIDING SCALE 3 mL 2   • Insulin Glargine (LANTUS SOLOSTAR) 100 UNIT/ML injection pen Inject 80 Units under the skin Every Evening. 90 mL 1   • JANUMET  MG per tablet TAKE 1 TABLET BY MOUTH 2 (TWO) TIMES A DAY. 180 tablet 1   • levothyroxine (SYNTHROID, LEVOTHROID) 125 MCG tablet TAKE 1 TABLET BY MOUTH EVERY MORNING. 90 tablet 1   • Liraglutide 18 MG/3ML solution pen-injector Inject 0.6 mg under the skin Daily. 1 pen 2   • lovastatin (MEVACOR) 20 MG tablet TAKE 1 TABLET BY MOUTH EVERY NIGHT. 90 tablet 1   • magnesium oxide (MAGOX) 400 (241.3 Mg) MG tablet tablet TAKE 2 TABLETS BY MOUTH DAILY. 180 tablet 1   • metOLazone (ZAROXOLYN) 5 MG tablet TAKE 0.5 TABLET BY MOUTH TWICE A WEEK 30 tablet 1   • Multiple Vitamin tablet Take 1 tablet by mouth every morning.     • phentermine (ADIPEX-P) 37.5 MG tablet Take 1  "tablet by mouth Every Morning Before Breakfast. 30 tablet 2   • potassium chloride (K-DUR,KLOR-CON) 20 MEQ CR tablet Take 1 tablet by mouth daily.     • ramipril (ALTACE) 10 MG capsule TAKE 1 CAPSULE BY MOUTH EVERY 12 (TWELVE) HOURS. 180 capsule 1   • ranitidine (ZANTAC) 150 MG capsule Take 1 capsule by mouth Every Night. 30 capsule 11   • terazosin (HYTRIN) 10 MG capsule TAKE ONE CAPSULE BY MOUTH AT BEDTIME 90 capsule 1   • Testosterone Cypionate (DEPOTESTOTERONE CYPIONATE) 200 MG/ML injection Inject 1 mL into the shoulder, thigh, or buttocks Every 28 (Twenty-Eight) Days. 10 mL 0   • topiramate (TOPAMAX) 100 MG tablet Take 1 tablet by mouth Daily Before Supper. 90 tablet 1   • topiramate (TOPAMAX) 25 MG tablet TAKE 2 TABLET EVERY MORNING  MG AT BEDTIME 60 tablet 5     No facility-administered medications prior to visit.        Patient Active Problem List   Diagnosis   • Atopic rhinitis   • Anemia   • Depression   • Diabetes mellitus   • Aptyalism   • Edema   • Gastroesophageal reflux disease   • Gout   • Hyperlipidemia   • Hypertension   • Hypothyroidism   • Mitral valve insufficiency   • Morbid obesity   • Osteoarthritis of hip   • Gonadotropin deficiency   • Preventative health care   • Elevated PSA   • Hyperuricemia       Advance Care Planning:  has NO advance directive - information provided to the patient today    Identification of Risk Factors:  Risk factors include: weight , unhealthy diet, cardiovascular risk, inactivity, chronic pain and polypharmacy.    Review of Systems    Compared to one year ago, the patient feels his physical health is the same.  Compared to one year ago, the patient feels his mental health is the same.    Objective     Physical Exam    Vitals:    11/01/17 1110   BP: 128/60   BP Location: Right arm   Patient Position: Sitting   Cuff Size: Adult   Pulse: 76   Resp: 18   Temp: 96.9 °F (36.1 °C)   TempSrc: Oral   SpO2: 95%   Weight: 262 lb (119 kg)   Height: 67\" (170.2 cm) "   PainSc: 4  Comment: left hip and knee chronic pain       Body mass index is 41.04 kg/(m^2).  Discussed the patient's BMI with him. The BMI is above average; BMI management plan is completed.    Assessment/Plan   Patient Self-Management and Personalized Health Advice  The patient has been provided with information about: diet, exercise, weight management, prevention of cardiac or vascular disease, fall prevention and designing advance directives and preventive services including:   · Counseling for cardiovascular disease risk reduction, Exercise counseling provided, Fall Risk assessment done, Influenza vaccine, Nutrition counseling provided.    Visit Diagnoses:  No diagnosis found.    No orders of the defined types were placed in this encounter.      Outpatient Encounter Prescriptions as of 11/1/2017   Medication Sig Dispense Refill   • ACCU-CHEK SERENITY PLUS test strip TEST 4 TIMES DAILY 200 each 2   • acetaminophen (TYLENOL) 500 MG tablet Take 2 tablets by mouth as needed.     • albuterol (PROVENTIL HFA;VENTOLIN HFA) 108 (90 BASE) MCG/ACT inhaler Inhale 1 puff as needed.     • allopurinol (ZYLOPRIM) 300 MG tablet TAKE 1 TABLET BY MOUTH DAILY. 90 tablet 1   • aspirin 81 MG tablet Take 1 tablet by mouth every morning.     • atenolol (TENORMIN) 25 MG tablet TAKE 1 TABLET BY MOUTH EVERY MORNING 90 tablet 1   • buPROPion XL (WELLBUTRIN XL) 150 MG 24 hr tablet TAKE 1 TABLET EVERY MORNING 90 tablet 1   • Cholecalciferol (VITAMIN D) 1000 UNITS tablet Take 3 tablets by mouth daily.     • fluticasone (FLONASE) 50 MCG/ACT nasal spray 1 spray into each nostril daily as needed.     • folic acid (FOLVITE) 1 MG tablet TAKE 2 TABLETS EVERY  tablet 1   • furosemide (LASIX) 20 MG tablet TAKE 1 TABLET BY MOUTH DAILY. 90 tablet 1   • insulin aspart (NOVOLOG FLEXPEN) 100 UNIT/ML solution pen-injector sc pen INJECT 6-12 UNITS 3 TIMES A DAY AS DIRECTED SLIDING SCALE 3 mL 2   • Insulin Glargine (LANTUS SOLOSTAR) 100 UNIT/ML  injection pen Inject 80 Units under the skin Every Evening. 90 mL 1   • JANUMET  MG per tablet TAKE 1 TABLET BY MOUTH 2 (TWO) TIMES A DAY. 180 tablet 1   • levothyroxine (SYNTHROID, LEVOTHROID) 125 MCG tablet TAKE 1 TABLET BY MOUTH EVERY MORNING. 90 tablet 1   • Liraglutide 18 MG/3ML solution pen-injector Inject 0.6 mg under the skin Daily. 1 pen 2   • lovastatin (MEVACOR) 20 MG tablet TAKE 1 TABLET BY MOUTH EVERY NIGHT. 90 tablet 1   • magnesium oxide (MAGOX) 400 (241.3 Mg) MG tablet tablet TAKE 2 TABLETS BY MOUTH DAILY. 180 tablet 1   • metOLazone (ZAROXOLYN) 5 MG tablet TAKE 0.5 TABLET BY MOUTH TWICE A WEEK 30 tablet 1   • Multiple Vitamin tablet Take 1 tablet by mouth every morning.     • phentermine (ADIPEX-P) 37.5 MG tablet Take 1 tablet by mouth Every Morning Before Breakfast. 30 tablet 2   • potassium chloride (K-DUR,KLOR-CON) 20 MEQ CR tablet Take 1 tablet by mouth daily.     • ramipril (ALTACE) 10 MG capsule TAKE 1 CAPSULE BY MOUTH EVERY 12 (TWELVE) HOURS. 180 capsule 1   • ranitidine (ZANTAC) 150 MG capsule Take 1 capsule by mouth Every Night. 30 capsule 11   • terazosin (HYTRIN) 10 MG capsule TAKE ONE CAPSULE BY MOUTH AT BEDTIME 90 capsule 1   • Testosterone Cypionate (DEPOTESTOTERONE CYPIONATE) 200 MG/ML injection Inject 1 mL into the shoulder, thigh, or buttocks Every 28 (Twenty-Eight) Days. 10 mL 0   • topiramate (TOPAMAX) 100 MG tablet Take 1 tablet by mouth Daily Before Supper. 90 tablet 1   • topiramate (TOPAMAX) 25 MG tablet TAKE 2 TABLET EVERY MORNING  MG AT BEDTIME 60 tablet 5     No facility-administered encounter medications on file as of 11/1/2017.        Reviewed use of high risk medication in the elderly: yes  Reviewed for potential of harmful drug interactions in the elderly: yes    Follow Up:  No Follow-up on file.     An After Visit Summary and PPPS with all of these plans were given to the patient.        The wellness exam has been reviewed in detail.  The patient has been  fully counseled on preventative guidelines for vaccines, cancer screenings, and other health maintenance needs.  Functional testing has been performed to assess capacity for independent living and need for other medical interventions.   The patient was counseled on maintaining a lifestyle to promote good health and to minimize chronic diseases.  The patient has been assisted with scheduling healthcare procedures for the coming year and given a written document outlining these recommendations.    Electronically signed Eric Foy M.D.11/4/2017 9:32 AM

## 2017-11-01 NOTE — PATIENT INSTRUCTIONS
1.  Speak to nurse on Friday morning - about test results.    2.  Plan new dose of Victoza - to improve diabetic control.    3.  Follow a low-calorie diabetic diet - low in salt and low in sugar.    4.  Walk daily for physical fitness.    5.  Return visit in early January - fasting checkup.

## 2017-11-04 NOTE — PROGRESS NOTES
"Subjective   Alberto Bailey is a 72 y.o. male.     History of Present Illness     The patient has a many year history of type 2 diabetes mellitus with poor control.  He has had poor compliance with his diabetic diet and admits that he often overeats.  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  He has had an associated morbid obesity with a weight is high as 300.    The following portions of the patient's history were reviewed and updated as appropriate: allergies, current medications, past family history, past medical history, past social history, past surgical history and problem list.    Review of Systems   Constitutional: Negative for appetite change and fatigue.   Respiratory: Negative for cough and shortness of breath.    Cardiovascular: Negative for chest pain and palpitations.   Gastrointestinal: Negative for abdominal distention and nausea.   Musculoskeletal: Positive for arthralgias. Negative for gait problem.   Neurological: Negative for dizziness and light-headedness.       Objective   Blood pressure 128/60, pulse 76, temperature 96.9 °F (36.1 °C), temperature source Oral, resp. rate 18, height 67\" (170.2 cm), weight 262 lb (119 kg), SpO2 95 %.    Physical Exam   Constitutional: He is oriented to person, place, and time. He appears well-developed and well-nourished. No distress.   Cardiovascular: Normal rate, regular rhythm and normal heart sounds.    Pulmonary/Chest: Effort normal and breath sounds normal. He has no wheezes. He has no rales.   Neurological: He is alert and oriented to person, place, and time. He exhibits normal muscle tone. Coordination normal.   Psychiatric: He has a normal mood and affect. His behavior is normal. Judgment and thought content normal.   Nursing note and vitals reviewed.    Procedures  Assessment/Plan   Alberto was seen today for annual exam and diabetes.    Diagnoses and all orders for this visit:    Flu vaccine need  -     Flu " Vaccine High Dose PF 65YR+ ([id number])    Hypothyroidism due to acquired atrophy of thyroid  -     Testosterone Cypionate (DEPOTESTOTERONE CYPIONATE) injection 200 mg; Inject 1 mL into the shoulder, thigh, or buttocks 1 (One) Time.    Other hyperlipidemia  -     Comprehensive Metabolic Panel  -     Lipid Panel    Essential hypertension    Morbid obesity    Type 2 diabetes mellitus without complication, with long-term current use of insulin  -     Hemoglobin A1c    Anemia, unspecified type  -     CBC & Differential  -     C-reactive Protein  -     Reticulocytes      The patient's diabetic control is slowly improving with a hemoglobin A1c dropping from 8.2% down to 8.0%.  He has recently started on Victoza and should increase the dose.     the patient's morbid obesity has improved somewhat in recent years but in fact has worsened over the last 7 months.  High doses of Victoza should be helpful with weight loss.      The patient has moderate hyperlipidemia and risk for atherosclerotic cardiovascular disease.  His LDL cholesterol is excellent on atorvastatin 20 mg.    The wellness exam has been reviewed in detail.  The patient has been fully counseled on preventative guidelines for vaccines, cancer screenings, and other health maintenance needs.  Functional testing has been performed to assess capacity for independent living and need for other medical interventions.   The patient was counseled on maintaining a lifestyle to promote good health and to minimize chronic diseases.  The patient has been assisted with scheduling healthcare procedures for the coming year and given a written document outlining these recommendations.    Patient Instructions   1.  Speak to nurse on Friday morning - about test results.    2.  Plan new dose of Victoza - to improve diabetic control.    3.  Follow a low-calorie diabetic diet - low in salt and low in sugar.    4.  Walk daily for physical fitness.    5.  Return visit in early January -  fasting checkup.    6.  Blood count is stable and acceptable.    7.  Hemoglobin A1c is slightly improved at 8.0%.  Continue to tighten diabetic control.increased dose of Victoza - 1.2 mg once weekly.    8.  LDL cholesterol was excellent at 53.    9.  Chemistry panel is acceptable requires no change in treatment.    Electronically signed Eric Foy M.D.11/4/2017 9:34 AM

## 2017-11-06 ENCOUNTER — TELEPHONE (OUTPATIENT)
Dept: INTERNAL MEDICINE | Facility: CLINIC | Age: 72
End: 2017-11-06

## 2017-11-06 DIAGNOSIS — E11.9 TYPE 2 DIABETES MELLITUS WITHOUT COMPLICATION, WITH LONG-TERM CURRENT USE OF INSULIN (HCC): ICD-10-CM

## 2017-11-06 DIAGNOSIS — Z79.4 TYPE 2 DIABETES MELLITUS WITHOUT COMPLICATION, WITH LONG-TERM CURRENT USE OF INSULIN (HCC): ICD-10-CM

## 2017-11-06 NOTE — TELEPHONE ENCOUNTER
Left message re: recent labs.  Per TGF -  Blood count is stable and acceptable.  Hemoglobin A1c is slightly improved at 8.0%.  Continue to tighten diabetic control.increased dose of Victoza - 1.2 mg once weekly.  LDL cholesterol was excellent at 53.  Chemistry panel is acceptable requires no change in treatment.

## 2017-11-10 RX ORDER — BUPROPION HYDROCHLORIDE 150 MG/1
TABLET ORAL
Qty: 90 TABLET | Refills: 1 | Status: SHIPPED | OUTPATIENT
Start: 2017-11-10 | End: 2018-01-01 | Stop reason: SDUPTHER

## 2017-11-10 RX ORDER — ATENOLOL 25 MG/1
TABLET ORAL
Qty: 90 TABLET | Refills: 1 | Status: SHIPPED | OUTPATIENT
Start: 2017-11-10 | End: 2018-01-01 | Stop reason: SDUPTHER

## 2017-12-22 DIAGNOSIS — E11.9 TYPE 2 DIABETES MELLITUS WITHOUT COMPLICATION, WITH LONG-TERM CURRENT USE OF INSULIN (HCC): ICD-10-CM

## 2017-12-22 DIAGNOSIS — Z79.4 TYPE 2 DIABETES MELLITUS WITHOUT COMPLICATION, WITH LONG-TERM CURRENT USE OF INSULIN (HCC): ICD-10-CM

## 2017-12-22 RX ORDER — INSULIN GLARGINE 100 [IU]/ML
INJECTION, SOLUTION SUBCUTANEOUS
Qty: 90 ML | Refills: 1 | Status: SHIPPED | OUTPATIENT
Start: 2017-12-22 | End: 2018-01-01 | Stop reason: SDUPTHER

## 2018-01-01 ENCOUNTER — OFFICE VISIT (OUTPATIENT)
Dept: NEUROSURGERY | Facility: CLINIC | Age: 73
End: 2018-01-01

## 2018-01-01 ENCOUNTER — HOSPITAL ENCOUNTER (OUTPATIENT)
Dept: PHYSICAL THERAPY | Facility: HOSPITAL | Age: 73
Setting detail: THERAPIES SERIES
Discharge: HOME | End: 2018-08-17

## 2018-01-01 ENCOUNTER — HOSPITAL ENCOUNTER (OUTPATIENT)
Dept: PHYSICAL THERAPY | Facility: HOSPITAL | Age: 73
Setting detail: THERAPIES SERIES
Discharge: HOME | End: 2018-08-24

## 2018-01-01 ENCOUNTER — HOSPITAL ENCOUNTER (OUTPATIENT)
Dept: PHYSICAL THERAPY | Facility: HOSPITAL | Age: 73
Setting detail: THERAPIES SERIES
Discharge: HOME | End: 2018-08-13

## 2018-01-01 ENCOUNTER — HOSPITAL ENCOUNTER (OUTPATIENT)
Dept: PHYSICAL THERAPY | Facility: HOSPITAL | Age: 73
Setting detail: THERAPIES SERIES
Discharge: HOME | End: 2018-08-27

## 2018-01-01 ENCOUNTER — LAB REQUISITION (OUTPATIENT)
Dept: LAB | Facility: HOSPITAL | Age: 73
End: 2018-01-01

## 2018-01-01 ENCOUNTER — APPOINTMENT (OUTPATIENT)
Dept: CT IMAGING | Facility: HOSPITAL | Age: 73
End: 2018-01-01

## 2018-01-01 ENCOUNTER — HOSPITAL ENCOUNTER (OUTPATIENT)
Dept: PHYSICAL THERAPY | Facility: HOSPITAL | Age: 73
Setting detail: THERAPIES SERIES
Discharge: HOME | End: 2018-07-27

## 2018-01-01 ENCOUNTER — HOSPITAL ENCOUNTER (EMERGENCY)
Facility: HOSPITAL | Age: 73
Discharge: HOME | End: 2018-06-24
Attending: EMERGENCY MEDICINE | Admitting: EMERGENCY MEDICINE

## 2018-01-01 ENCOUNTER — HOSPITAL ENCOUNTER (OUTPATIENT)
Dept: PHYSICAL THERAPY | Facility: HOSPITAL | Age: 73
Setting detail: THERAPIES SERIES
Discharge: HOME | End: 2018-08-08

## 2018-01-01 ENCOUNTER — HOSPITAL ENCOUNTER (OUTPATIENT)
Dept: PHYSICAL THERAPY | Facility: HOSPITAL | Age: 73
Setting detail: THERAPIES SERIES
Discharge: HOME | End: 2018-08-10

## 2018-01-01 ENCOUNTER — HOSPITAL ENCOUNTER (OUTPATIENT)
Dept: PHYSICAL THERAPY | Facility: HOSPITAL | Age: 73
Setting detail: THERAPIES SERIES
Discharge: HOME | End: 2018-07-23

## 2018-01-01 ENCOUNTER — OFFICE VISIT (OUTPATIENT)
Dept: FAMILY MEDICINE CLINIC | Facility: CLINIC | Age: 73
End: 2018-01-01

## 2018-01-01 ENCOUNTER — HOSPITAL ENCOUNTER (OUTPATIENT)
Dept: PHYSICAL THERAPY | Facility: HOSPITAL | Age: 73
Setting detail: THERAPIES SERIES
End: 2018-01-01

## 2018-01-01 ENCOUNTER — HOSPITAL ENCOUNTER (OUTPATIENT)
Dept: PHYSICAL THERAPY | Facility: HOSPITAL | Age: 73
Setting detail: THERAPIES SERIES
Discharge: HOME | End: 2018-09-05

## 2018-01-01 ENCOUNTER — TELEPHONE (OUTPATIENT)
Dept: INTERNAL MEDICINE | Facility: CLINIC | Age: 73
End: 2018-01-01

## 2018-01-01 ENCOUNTER — HOSPITAL ENCOUNTER (OUTPATIENT)
Dept: PHYSICAL THERAPY | Facility: HOSPITAL | Age: 73
Setting detail: THERAPIES SERIES
Discharge: HOME | End: 2018-08-03

## 2018-01-01 ENCOUNTER — HOSPITAL ENCOUNTER (OUTPATIENT)
Dept: CARDIOLOGY | Facility: HOSPITAL | Age: 73
Discharge: HOME | End: 2018-06-22
Admitting: NURSE PRACTITIONER

## 2018-01-01 ENCOUNTER — HOSPITAL ENCOUNTER (OUTPATIENT)
Dept: PHYSICAL THERAPY | Facility: HOSPITAL | Age: 73
Setting detail: THERAPIES SERIES
Discharge: HOME | End: 2018-09-07

## 2018-01-01 ENCOUNTER — OFFICE VISIT (OUTPATIENT)
Dept: INTERNAL MEDICINE | Facility: CLINIC | Age: 73
End: 2018-01-01

## 2018-01-01 ENCOUNTER — HOSPITAL ENCOUNTER (OUTPATIENT)
Dept: PHYSICAL THERAPY | Facility: HOSPITAL | Age: 73
Setting detail: THERAPIES SERIES
Discharge: HOME | End: 2018-08-31

## 2018-01-01 ENCOUNTER — HOSPITAL ENCOUNTER (OUTPATIENT)
Dept: PHYSICAL THERAPY | Facility: HOSPITAL | Age: 73
Setting detail: THERAPIES SERIES
Discharge: HOME | End: 2018-08-01

## 2018-01-01 ENCOUNTER — HOSPITAL ENCOUNTER (EMERGENCY)
Facility: HOSPITAL | Age: 73
Discharge: HOME | End: 2018-06-21
Attending: EMERGENCY MEDICINE | Admitting: EMERGENCY MEDICINE

## 2018-01-01 ENCOUNTER — APPOINTMENT (OUTPATIENT)
Dept: MRI IMAGING | Facility: HOSPITAL | Age: 73
End: 2018-01-01

## 2018-01-01 ENCOUNTER — HOSPITAL ENCOUNTER (OUTPATIENT)
Dept: PHYSICAL THERAPY | Facility: HOSPITAL | Age: 73
Setting detail: THERAPIES SERIES
Discharge: HOME | End: 2018-08-15

## 2018-01-01 VITALS
SYSTOLIC BLOOD PRESSURE: 162 MMHG | OXYGEN SATURATION: 97 % | TEMPERATURE: 96.8 F | DIASTOLIC BLOOD PRESSURE: 82 MMHG | WEIGHT: 259.2 LBS | HEART RATE: 88 BPM | BODY MASS INDEX: 40.68 KG/M2 | HEIGHT: 67 IN

## 2018-01-01 VITALS
HEART RATE: 76 BPM | DIASTOLIC BLOOD PRESSURE: 80 MMHG | OXYGEN SATURATION: 96 % | WEIGHT: 257 LBS | BODY MASS INDEX: 40.34 KG/M2 | HEIGHT: 67 IN | TEMPERATURE: 98 F | SYSTOLIC BLOOD PRESSURE: 130 MMHG

## 2018-01-01 VITALS
BODY MASS INDEX: 40.81 KG/M2 | OXYGEN SATURATION: 98 % | HEIGHT: 67 IN | DIASTOLIC BLOOD PRESSURE: 90 MMHG | TEMPERATURE: 98.1 F | SYSTOLIC BLOOD PRESSURE: 195 MMHG | RESPIRATION RATE: 20 BRPM | HEART RATE: 88 BPM | WEIGHT: 260 LBS

## 2018-01-01 VITALS
OXYGEN SATURATION: 99 % | RESPIRATION RATE: 16 BRPM | BODY MASS INDEX: 39.12 KG/M2 | DIASTOLIC BLOOD PRESSURE: 68 MMHG | SYSTOLIC BLOOD PRESSURE: 138 MMHG | WEIGHT: 249.8 LBS | HEART RATE: 64 BPM | TEMPERATURE: 97 F

## 2018-01-01 VITALS — HEIGHT: 67 IN | WEIGHT: 255 LBS | TEMPERATURE: 98 F | BODY MASS INDEX: 40.02 KG/M2

## 2018-01-01 VITALS
OXYGEN SATURATION: 94 % | HEART RATE: 79 BPM | BODY MASS INDEX: 40.59 KG/M2 | WEIGHT: 258.6 LBS | TEMPERATURE: 97.9 F | SYSTOLIC BLOOD PRESSURE: 132 MMHG | DIASTOLIC BLOOD PRESSURE: 72 MMHG | HEIGHT: 67 IN

## 2018-01-01 VITALS
WEIGHT: 260 LBS | SYSTOLIC BLOOD PRESSURE: 138 MMHG | DIASTOLIC BLOOD PRESSURE: 84 MMHG | OXYGEN SATURATION: 98 % | HEART RATE: 87 BPM | HEIGHT: 67 IN | BODY MASS INDEX: 40.81 KG/M2

## 2018-01-01 VITALS — TEMPERATURE: 97.1 F | BODY MASS INDEX: 41.84 KG/M2 | RESPIRATION RATE: 17 BRPM | WEIGHT: 266.6 LBS | HEIGHT: 67 IN

## 2018-01-01 VITALS
HEIGHT: 67 IN | OXYGEN SATURATION: 95 % | RESPIRATION RATE: 18 BRPM | BODY MASS INDEX: 40.81 KG/M2 | SYSTOLIC BLOOD PRESSURE: 169 MMHG | HEART RATE: 66 BPM | TEMPERATURE: 98 F | DIASTOLIC BLOOD PRESSURE: 99 MMHG | WEIGHT: 260 LBS

## 2018-01-01 DIAGNOSIS — Z00.00 ROUTINE GENERAL MEDICAL EXAMINATION AT A HEALTH CARE FACILITY: ICD-10-CM

## 2018-01-01 DIAGNOSIS — Z79.4 TYPE 2 DIABETES MELLITUS WITHOUT COMPLICATION, WITH LONG-TERM CURRENT USE OF INSULIN (HCC): ICD-10-CM

## 2018-01-01 DIAGNOSIS — Z79.4 TYPE 2 DIABETES MELLITUS WITHOUT COMPLICATION, WITH LONG-TERM CURRENT USE OF INSULIN (HCC): Primary | ICD-10-CM

## 2018-01-01 DIAGNOSIS — M25.562 CHRONIC PAIN OF LEFT KNEE: ICD-10-CM

## 2018-01-01 DIAGNOSIS — N28.89 RENAL MASS: ICD-10-CM

## 2018-01-01 DIAGNOSIS — E03.4 HYPOTHYROIDISM DUE TO ACQUIRED ATROPHY OF THYROID: ICD-10-CM

## 2018-01-01 DIAGNOSIS — M48.061 SPINAL STENOSIS, LUMBAR REGION, WITHOUT NEUROGENIC CLAUDICATION: Primary | ICD-10-CM

## 2018-01-01 DIAGNOSIS — K21.9 GASTROESOPHAGEAL REFLUX DISEASE WITHOUT ESOPHAGITIS: ICD-10-CM

## 2018-01-01 DIAGNOSIS — M54.50 ACUTE RIGHT-SIDED LOW BACK PAIN WITHOUT SCIATICA: ICD-10-CM

## 2018-01-01 DIAGNOSIS — S73.101A THIGH SPRAIN, RIGHT, INITIAL ENCOUNTER: Primary | ICD-10-CM

## 2018-01-01 DIAGNOSIS — E11.9 TYPE 2 DIABETES MELLITUS WITHOUT COMPLICATION, WITH LONG-TERM CURRENT USE OF INSULIN (HCC): ICD-10-CM

## 2018-01-01 DIAGNOSIS — M16.0 PRIMARY OSTEOARTHRITIS OF BOTH HIPS: ICD-10-CM

## 2018-01-01 DIAGNOSIS — M47.816 LUMBAR SPONDYLOSIS: ICD-10-CM

## 2018-01-01 DIAGNOSIS — R97.20 PSA ELEVATION: Primary | ICD-10-CM

## 2018-01-01 DIAGNOSIS — E78.2 MIXED HYPERLIPIDEMIA: Primary | ICD-10-CM

## 2018-01-01 DIAGNOSIS — M79.651 PAIN OF RIGHT THIGH: ICD-10-CM

## 2018-01-01 DIAGNOSIS — I10 ESSENTIAL HYPERTENSION: ICD-10-CM

## 2018-01-01 DIAGNOSIS — M51.9 LUMBAR DISC DISEASE: Primary | ICD-10-CM

## 2018-01-01 DIAGNOSIS — I34.0 NON-RHEUMATIC MITRAL REGURGITATION: ICD-10-CM

## 2018-01-01 DIAGNOSIS — E66.01 MORBID OBESITY (HCC): ICD-10-CM

## 2018-01-01 DIAGNOSIS — R97.20 ELEVATED PSA: ICD-10-CM

## 2018-01-01 DIAGNOSIS — E79.0 HYPERURICEMIA: ICD-10-CM

## 2018-01-01 DIAGNOSIS — G89.29 CHRONIC PAIN OF LEFT KNEE: ICD-10-CM

## 2018-01-01 DIAGNOSIS — M79.604 RIGHT LEG PAIN: ICD-10-CM

## 2018-01-01 DIAGNOSIS — M54.16 LUMBAR RADICULOPATHY: ICD-10-CM

## 2018-01-01 DIAGNOSIS — M54.41 ACUTE RIGHT-SIDED LOW BACK PAIN WITH RIGHT-SIDED SCIATICA: ICD-10-CM

## 2018-01-01 DIAGNOSIS — Z12.5 ENCOUNTER FOR SCREENING FOR MALIGNANT NEOPLASM OF PROSTATE: ICD-10-CM

## 2018-01-01 DIAGNOSIS — M48.061 SPINAL STENOSIS OF LUMBAR REGION WITHOUT NEUROGENIC CLAUDICATION: Primary | ICD-10-CM

## 2018-01-01 DIAGNOSIS — R97.20 ELEVATED PSA: Primary | ICD-10-CM

## 2018-01-01 DIAGNOSIS — E34.9 TESTOSTERONE DEFICIENCY: ICD-10-CM

## 2018-01-01 DIAGNOSIS — M51.36 BULGING LUMBAR DISC: Primary | ICD-10-CM

## 2018-01-01 DIAGNOSIS — E55.9 VITAMIN D DEFICIENCY: ICD-10-CM

## 2018-01-01 DIAGNOSIS — M51.9 LUMBAR DISC DISEASE: ICD-10-CM

## 2018-01-01 DIAGNOSIS — F33.42 RECURRENT MAJOR DEPRESSIVE DISORDER, IN FULL REMISSION (HCC): ICD-10-CM

## 2018-01-01 DIAGNOSIS — E53.9 VITAMIN B DEFICIENCY: ICD-10-CM

## 2018-01-01 DIAGNOSIS — E78.2 MIXED HYPERLIPIDEMIA: ICD-10-CM

## 2018-01-01 DIAGNOSIS — R60.1 GENERALIZED EDEMA: ICD-10-CM

## 2018-01-01 DIAGNOSIS — E11.9 TYPE 2 DIABETES MELLITUS WITHOUT COMPLICATION, WITH LONG-TERM CURRENT USE OF INSULIN (HCC): Primary | ICD-10-CM

## 2018-01-01 LAB
25(OH)D3+25(OH)D2 SERPL-MCNC: 47.6 NG/ML
ALBUMIN SERPL-MCNC: 4.19 G/DL (ref 3.2–4.8)
ALBUMIN SERPL-MCNC: 4.24 G/DL (ref 3.2–4.8)
ALBUMIN SERPL-MCNC: 4.4 G/DL (ref 3.2–4.8)
ALBUMIN/GLOB SERPL: 1.8 G/DL (ref 1.5–2.5)
ALBUMIN/GLOB SERPL: 1.9 G/DL (ref 1.5–2.5)
ALBUMIN/GLOB SERPL: 2.2 G/DL (ref 1.5–2.5)
ALP SERPL-CCNC: 108 U/L (ref 25–100)
ALP SERPL-CCNC: 111 U/L (ref 25–100)
ALP SERPL-CCNC: 130 U/L (ref 25–100)
ALT SERPL-CCNC: 28 U/L (ref 7–40)
ALT SERPL-CCNC: 35 U/L (ref 7–40)
ALT SERPL-CCNC: 35 U/L (ref 7–40)
APPEARANCE UR: CLEAR
APPEARANCE UR: CLEAR
AST SERPL-CCNC: 32 U/L (ref 0–33)
AST SERPL-CCNC: 33 U/L (ref 0–33)
AST SERPL-CCNC: 42 U/L (ref 0–33)
BACTERIA #/AREA URNS HPF: ABNORMAL /HPF
BACTERIA #/AREA URNS HPF: ABNORMAL /[HPF]
BASOPHILS # BLD AUTO: 0 X10E3/UL (ref 0–0.2)
BASOPHILS # BLD AUTO: 0.02 10*3/MM3 (ref 0–0.2)
BASOPHILS NFR BLD AUTO: 0 %
BASOPHILS NFR BLD AUTO: 0.2 % (ref 0–1)
BH CV ECHO MEAS - BSA(HAYCOCK): 2.4 M^2
BH CV ECHO MEAS - BSA: 2.3 M^2
BH CV ECHO MEAS - BZI_BMI: 40.7 KILOGRAMS/M^2
BH CV ECHO MEAS - BZI_METRIC_HEIGHT: 170.2 CM
BH CV ECHO MEAS - BZI_METRIC_WEIGHT: 117.9 KG
BH CV LOWER VASCULAR LEFT COMMON FEMORAL AUGMENT: NORMAL
BH CV LOWER VASCULAR LEFT COMMON FEMORAL COMPRESS: NORMAL
BH CV LOWER VASCULAR LEFT COMMON FEMORAL PHASIC: NORMAL
BH CV LOWER VASCULAR LEFT COMMON FEMORAL SPONT: NORMAL
BH CV LOWER VASCULAR RIGHT COMMON FEMORAL AUGMENT: NORMAL
BH CV LOWER VASCULAR RIGHT COMMON FEMORAL COMPRESS: NORMAL
BH CV LOWER VASCULAR RIGHT COMMON FEMORAL PHASIC: NORMAL
BH CV LOWER VASCULAR RIGHT COMMON FEMORAL SPONT: NORMAL
BH CV LOWER VASCULAR RIGHT DISTAL FEMORAL AUGMENT: NORMAL
BH CV LOWER VASCULAR RIGHT DISTAL FEMORAL COMPRESS: NORMAL
BH CV LOWER VASCULAR RIGHT DISTAL FEMORAL PHASIC: NORMAL
BH CV LOWER VASCULAR RIGHT DISTAL FEMORAL SPONT: NORMAL
BH CV LOWER VASCULAR RIGHT GASTRONEMIUS COMPRESS: NORMAL
BH CV LOWER VASCULAR RIGHT GREATER SAPH AK COMPRESS: NORMAL
BH CV LOWER VASCULAR RIGHT GREATER SAPH BK COMPRESS: NORMAL
BH CV LOWER VASCULAR RIGHT LESSER SAPH COMPRESS: NORMAL
BH CV LOWER VASCULAR RIGHT MID FEMORAL AUGMENT: NORMAL
BH CV LOWER VASCULAR RIGHT MID FEMORAL COMPRESS: NORMAL
BH CV LOWER VASCULAR RIGHT MID FEMORAL PHASIC: NORMAL
BH CV LOWER VASCULAR RIGHT MID FEMORAL SPONT: NORMAL
BH CV LOWER VASCULAR RIGHT PERONEAL COMPRESS: NORMAL
BH CV LOWER VASCULAR RIGHT POPLITEAL AUGMENT: NORMAL
BH CV LOWER VASCULAR RIGHT POPLITEAL COMPRESS: NORMAL
BH CV LOWER VASCULAR RIGHT POPLITEAL PHASIC: NORMAL
BH CV LOWER VASCULAR RIGHT POPLITEAL SPONT: NORMAL
BH CV LOWER VASCULAR RIGHT POSTERIOR TIBIAL COMPRESS: NORMAL
BH CV LOWER VASCULAR RIGHT PROXIMAL FEMORAL AUGMENT: NORMAL
BH CV LOWER VASCULAR RIGHT PROXIMAL FEMORAL COMPRESS: NORMAL
BH CV LOWER VASCULAR RIGHT PROXIMAL FEMORAL PHASIC: NORMAL
BH CV LOWER VASCULAR RIGHT PROXIMAL FEMORAL SPONT: NORMAL
BH CV LOWER VASCULAR RIGHT SAPHENOFEMORAL JUNCTION COMPRESS: NORMAL
BH CV LOWER VASCULAR RIGHT SAPHENOFEMORAL JUNCTION PHASIC: NORMAL
BH CV LOWER VASCULAR RIGHT SAPHENOFEMORAL JUNCTION SPONT: NORMAL
BILIRUB SERPL-MCNC: 0.3 MG/DL (ref 0.3–1.2)
BILIRUB UR QL STRIP: NEGATIVE
BILIRUB UR QL STRIP: NEGATIVE
BUN SERPL-MCNC: 25 MG/DL (ref 9–23)
BUN SERPL-MCNC: 28 MG/DL (ref 9–23)
BUN SERPL-MCNC: 30 MG/DL (ref 9–23)
BUN/CREAT SERPL: 20 (ref 7–25)
BUN/CREAT SERPL: 21.4 (ref 7–25)
BUN/CREAT SERPL: 25.4 (ref 7–25)
CALCIUM SERPL-MCNC: 9.5 MG/DL (ref 8.7–10.4)
CALCIUM SERPL-MCNC: 9.5 MG/DL (ref 8.7–10.4)
CALCIUM SERPL-MCNC: 9.7 MG/DL (ref 8.7–10.4)
CASTS URNS MICRO: ABNORMAL
CASTS URNS MICRO: ABNORMAL
CASTS URNS QL MICRO: PRESENT /LPF
CASTS URNS QL MICRO: PRESENT /LPF
CHLORIDE SERPL-SCNC: 100 MMOL/L (ref 99–109)
CHLORIDE SERPL-SCNC: 104 MMOL/L (ref 99–109)
CHLORIDE SERPL-SCNC: 99 MMOL/L (ref 99–109)
CHOLEST SERPL-MCNC: 112 MG/DL (ref 0–200)
CHOLEST SERPL-MCNC: 114 MG/DL (ref 0–200)
CHOLEST SERPL-MCNC: 120 MG/DL (ref 0–200)
CO2 SERPL-SCNC: 28 MMOL/L (ref 20–31)
CO2 SERPL-SCNC: 30 MMOL/L (ref 20–31)
CO2 SERPL-SCNC: 32 MMOL/L (ref 20–31)
COLOR UR: YELLOW
COLOR UR: YELLOW
CREAT SERPL-MCNC: 1.17 MG/DL (ref 0.6–1.3)
CREAT SERPL-MCNC: 1.18 MG/DL (ref 0.6–1.3)
CREAT SERPL-MCNC: 1.4 MG/DL (ref 0.6–1.3)
CRP SERPL-MCNC: 0.67 MG/DL (ref 0–1)
CRP SERPL-MCNC: 4 MG/L (ref 0–4.9)
EOSINOPHIL # BLD AUTO: 0.41 10*3/MM3 (ref 0–0.3)
EOSINOPHIL # BLD AUTO: 0.5 X10E3/UL (ref 0–0.4)
EOSINOPHIL NFR BLD AUTO: 4 %
EOSINOPHIL NFR BLD AUTO: 4.3 % (ref 0–3)
EPI CELLS #/AREA URNS HPF: ABNORMAL /HPF
EPI CELLS #/AREA URNS HPF: ABNORMAL /HPF
ERYTHROCYTE [DISTWIDTH] IN BLOOD BY AUTOMATED COUNT: 14.5 % (ref 11.3–14.5)
ERYTHROCYTE [DISTWIDTH] IN BLOOD BY AUTOMATED COUNT: 15.4 % (ref 12.3–15.4)
ERYTHROCYTE [SEDIMENTATION RATE] IN BLOOD BY WESTERGREN METHOD: 21 MM/HR (ref 0–30)
GFR SERPLBLD CREATININE-BSD FMLA CKD-EPI: 50 ML/MIN/1.73
GFR SERPLBLD CREATININE-BSD FMLA CKD-EPI: 61 ML/MIN/1.73
GLOBULIN SER CALC-MCNC: 1.9 GM/DL
GLOBULIN SER CALC-MCNC: 2.3 GM/DL
GLOBULIN SER CALC-MCNC: 2.4 GM/DL
GLUCOSE SERPL-MCNC: 112 MG/DL (ref 70–100)
GLUCOSE SERPL-MCNC: 71 MG/DL (ref 70–100)
GLUCOSE SERPL-MCNC: 74 MG/DL (ref 70–100)
GLUCOSE UR QL: ABNORMAL
GLUCOSE UR QL: NEGATIVE
HBA1C MFR BLD: 7.1 %
HBA1C MFR BLD: 7.2 % (ref 4.8–5.6)
HBA1C MFR BLD: 8.2 % (ref 4.8–5.6)
HCT VFR BLD AUTO: 37.4 % (ref 38.9–50.9)
HCT VFR BLD AUTO: 39.8 % (ref 37.5–51)
HDLC SERPL-MCNC: 33 MG/DL (ref 40–60)
HDLC SERPL-MCNC: 35 MG/DL (ref 40–60)
HDLC SERPL-MCNC: 42 MG/DL (ref 40–60)
HGB BLD-MCNC: 12 G/DL (ref 13.1–17.5)
HGB BLD-MCNC: 13.1 G/DL (ref 13–17.7)
HGB UR QL STRIP: NEGATIVE
HGB UR QL STRIP: NEGATIVE
IMM GRANULOCYTES # BLD: 0 X10E3/UL (ref 0–0.1)
IMM GRANULOCYTES # BLD: 0.04 10*3/MM3 (ref 0–0.03)
IMM GRANULOCYTES NFR BLD: 0 %
IMM GRANULOCYTES NFR BLD: 0.4 % (ref 0–0.6)
KETONES UR QL STRIP: ABNORMAL
KETONES UR QL STRIP: NEGATIVE
LDLC SERPL CALC-MCNC: 47 MG/DL (ref 0–100)
LDLC SERPL CALC-MCNC: 51 MG/DL (ref 0–100)
LDLC SERPL CALC-MCNC: 51 MG/DL (ref 0–100)
LEUKOCYTE ESTERASE UR QL STRIP: NEGATIVE
LEUKOCYTE ESTERASE UR QL STRIP: NEGATIVE
LYMPHOCYTES # BLD AUTO: 2.5 X10E3/UL (ref 0.7–3.1)
LYMPHOCYTES # BLD AUTO: 2.55 10*3/MM3 (ref 0.6–4.8)
LYMPHOCYTES NFR BLD AUTO: 20 %
LYMPHOCYTES NFR BLD AUTO: 27 % (ref 24–44)
Lab: NORMAL
MAGNESIUM SERPL-MCNC: 1.6 MG/DL (ref 1.3–2.7)
MCH RBC QN AUTO: 30.3 PG (ref 27–31)
MCH RBC QN AUTO: 30.8 PG (ref 26.6–33)
MCHC RBC AUTO-ENTMCNC: 32.1 G/DL (ref 32–36)
MCHC RBC AUTO-ENTMCNC: 32.9 G/DL (ref 31.5–35.7)
MCV RBC AUTO: 94 FL (ref 79–97)
MCV RBC AUTO: 94.4 FL (ref 80–99)
MICRO URNS: ABNORMAL
MICRO URNS: ABNORMAL
MICRO URNS: NORMAL
MICRO URNS: NORMAL
MONOCYTES # BLD AUTO: 1.01 10*3/MM3 (ref 0–1)
MONOCYTES # BLD AUTO: 1.2 X10E3/UL (ref 0.1–0.9)
MONOCYTES NFR BLD AUTO: 10 %
MONOCYTES NFR BLD AUTO: 10.7 % (ref 0–12)
MUCOUS THREADS URNS QL MICRO: PRESENT
MUCOUS THREADS URNS QL MICRO: PRESENT /HPF
NEUTROPHILS # BLD AUTO: 5.41 10*3/MM3 (ref 1.5–8.3)
NEUTROPHILS # BLD AUTO: 8.1 X10E3/UL (ref 1.4–7)
NEUTROPHILS NFR BLD AUTO: 57.4 % (ref 41–71)
NEUTROPHILS NFR BLD AUTO: 66 %
NITRITE UR QL STRIP: NEGATIVE
NITRITE UR QL STRIP: NEGATIVE
PH UR STRIP: 5 [PH] (ref 5–7.5)
PH UR STRIP: 5.5 [PH] (ref 5–7.5)
PLATELET # BLD AUTO: 170 10*3/MM3 (ref 150–450)
PLATELET # BLD AUTO: 227 X10E3/UL (ref 150–379)
POTASSIUM SERPL-SCNC: 4.3 MMOL/L (ref 3.5–5.5)
POTASSIUM SERPL-SCNC: 4.3 MMOL/L (ref 3.5–5.5)
POTASSIUM SERPL-SCNC: 4.5 MMOL/L (ref 3.5–5.5)
PROT SERPL-MCNC: 6.1 G/DL (ref 5.7–8.2)
PROT SERPL-MCNC: 6.6 G/DL (ref 5.7–8.2)
PROT SERPL-MCNC: 6.7 G/DL (ref 5.7–8.2)
PROT UR QL STRIP: NEGATIVE
PROT UR QL STRIP: NEGATIVE
PSA SERPL-MCNC: 5.37 NG/ML (ref 0–4)
PSA SERPL-MCNC: 5.9 NG/ML (ref 0–4)
PSA SERPL-MCNC: 5.95 NG/ML (ref 0–4)
RBC # BLD AUTO: 3.96 10*6/MM3 (ref 4.2–5.76)
RBC # BLD AUTO: 4.25 X10E6/UL (ref 4.14–5.8)
RBC #/AREA URNS HPF: ABNORMAL /HPF
RBC #/AREA URNS HPF: ABNORMAL /HPF
SODIUM SERPL-SCNC: 140 MMOL/L (ref 132–146)
SODIUM SERPL-SCNC: 142 MMOL/L (ref 132–146)
SODIUM SERPL-SCNC: 143 MMOL/L (ref 132–146)
SP GR UR: 1.01 (ref 1–1.03)
SP GR UR: >=1.03 (ref 1–1.03)
T3FREE SERPL-MCNC: 2.6 PG/ML (ref 2–4.4)
T4 FREE SERPL-MCNC: 1.54 NG/DL (ref 0.89–1.76)
T4 FREE SERPL-MCNC: 1.77 NG/DL (ref 0.89–1.76)
TRIGL SERPL-MCNC: 140 MG/DL (ref 0–150)
TRIGL SERPL-MCNC: 140 MG/DL (ref 0–150)
TRIGL SERPL-MCNC: 154 MG/DL (ref 0–150)
TSH SERPL DL<=0.005 MIU/L-ACNC: 2.2 MIU/ML (ref 0.35–5.35)
TSH SERPL DL<=0.005 MIU/L-ACNC: 2.55 MIU/ML (ref 0.35–5.35)
URATE SERPL-MCNC: 6.2 MG/DL (ref 3.7–9.2)
URINALYSIS REFLEX: ABNORMAL
URINALYSIS REFLEX: NORMAL
UROBILINOGEN UR STRIP-MCNC: 0.2 MG/DL (ref 0.2–1)
UROBILINOGEN UR STRIP-MCNC: 1 MG/DL (ref 0.2–1)
VIT B12 SERPL-MCNC: 844 PG/ML (ref 211–911)
VLDLC SERPL CALC-MCNC: 28 MG/DL
VLDLC SERPL CALC-MCNC: 28 MG/DL
VLDLC SERPL CALC-MCNC: 30.8 MG/DL
WBC # BLD AUTO: 12.5 X10E3/UL (ref 3.4–10.8)
WBC # BLD AUTO: 9.44 10*3/MM3 (ref 3.5–10.8)
WBC #/AREA URNS HPF: ABNORMAL /HPF
WBC #/AREA URNS HPF: ABNORMAL /HPF

## 2018-01-01 PROCEDURE — 99213 OFFICE O/P EST LOW 20 MIN: CPT | Performed by: PHYSICIAN ASSISTANT

## 2018-01-01 PROCEDURE — 97110 THERAPEUTIC EXERCISES: CPT

## 2018-01-01 PROCEDURE — 97161 PT EVAL LOW COMPLEX 20 MIN: CPT | Performed by: PHYSICAL THERAPIST

## 2018-01-01 PROCEDURE — 93971 EXTREMITY STUDY: CPT

## 2018-01-01 PROCEDURE — 83036 HEMOGLOBIN GLYCOSYLATED A1C: CPT | Performed by: FAMILY MEDICINE

## 2018-01-01 PROCEDURE — 90662 IIV NO PRSV INCREASED AG IM: CPT | Performed by: FAMILY MEDICINE

## 2018-01-01 PROCEDURE — 93971 EXTREMITY STUDY: CPT | Performed by: INTERNAL MEDICINE

## 2018-01-01 PROCEDURE — G8978 MOBILITY CURRENT STATUS: HCPCS

## 2018-01-01 PROCEDURE — 96372 THER/PROPH/DIAG INJ SC/IM: CPT

## 2018-01-01 PROCEDURE — 36415 COLL VENOUS BLD VENIPUNCTURE: CPT | Performed by: INTERNAL MEDICINE

## 2018-01-01 PROCEDURE — G8978 MOBILITY CURRENT STATUS: HCPCS | Performed by: PHYSICAL THERAPIST

## 2018-01-01 PROCEDURE — 72131 CT LUMBAR SPINE W/O DYE: CPT

## 2018-01-01 PROCEDURE — 99283 EMERGENCY DEPT VISIT LOW MDM: CPT

## 2018-01-01 PROCEDURE — 99214 OFFICE O/P EST MOD 30 MIN: CPT | Performed by: FAMILY MEDICINE

## 2018-01-01 PROCEDURE — 99215 OFFICE O/P EST HI 40 MIN: CPT | Performed by: INTERNAL MEDICINE

## 2018-01-01 PROCEDURE — 99212 OFFICE O/P EST SF 10 MIN: CPT | Performed by: NEUROLOGICAL SURGERY

## 2018-01-01 PROCEDURE — 96372 THER/PROPH/DIAG INJ SC/IM: CPT | Performed by: INTERNAL MEDICINE

## 2018-01-01 PROCEDURE — G8979 MOBILITY GOAL STATUS: HCPCS | Performed by: PHYSICAL THERAPIST

## 2018-01-01 PROCEDURE — 25010000002 KETOROLAC TROMETHAMINE PER 15 MG: Performed by: NURSE PRACTITIONER

## 2018-01-01 PROCEDURE — 25010000002 TRIAMCINOLONE PER 10 MG: Performed by: NURSE PRACTITIONER

## 2018-01-01 PROCEDURE — 99214 OFFICE O/P EST MOD 30 MIN: CPT | Performed by: INTERNAL MEDICINE

## 2018-01-01 PROCEDURE — G0008 ADMIN INFLUENZA VIRUS VAC: HCPCS | Performed by: FAMILY MEDICINE

## 2018-01-01 PROCEDURE — G8980 MOBILITY D/C STATUS: HCPCS

## 2018-01-01 PROCEDURE — 36415 COLL VENOUS BLD VENIPUNCTURE: CPT | Performed by: FAMILY MEDICINE

## 2018-01-01 PROCEDURE — 99203 OFFICE O/P NEW LOW 30 MIN: CPT | Performed by: NEUROLOGICAL SURGERY

## 2018-01-01 PROCEDURE — G8979 MOBILITY GOAL STATUS: HCPCS

## 2018-01-01 PROCEDURE — 93000 ELECTROCARDIOGRAM COMPLETE: CPT | Performed by: INTERNAL MEDICINE

## 2018-01-01 RX ORDER — ALLOPURINOL 300 MG/1
TABLET ORAL
Qty: 30 TABLET | Refills: 5 | Status: SHIPPED | OUTPATIENT
Start: 2018-01-01

## 2018-01-01 RX ORDER — RAMIPRIL 10 MG/1
CAPSULE ORAL
Qty: 180 CAPSULE | Refills: 1 | Status: SHIPPED | OUTPATIENT
Start: 2018-01-01 | End: 2018-01-01 | Stop reason: SDUPTHER

## 2018-01-01 RX ORDER — METOLAZONE 5 MG/1
TABLET ORAL
Qty: 30 TABLET | Refills: 1 | Status: SHIPPED | OUTPATIENT
Start: 2018-01-01

## 2018-01-01 RX ORDER — LEVOTHYROXINE SODIUM 0.12 MG/1
TABLET ORAL
Qty: 90 TABLET | Refills: 1 | Status: SHIPPED | OUTPATIENT
Start: 2018-01-01 | End: 2018-01-01 | Stop reason: SDUPTHER

## 2018-01-01 RX ORDER — CIPROFLOXACIN 500 MG/1
500 TABLET, FILM COATED ORAL EVERY 12 HOURS SCHEDULED
Qty: 14 TABLET | Refills: 0 | Status: SHIPPED | OUTPATIENT
Start: 2018-01-01 | End: 2019-01-01

## 2018-01-01 RX ORDER — OXYCODONE AND ACETAMINOPHEN 10; 325 MG/1; MG/1
.5-1 TABLET ORAL 2 TIMES DAILY PRN
Qty: 20 TABLET | Refills: 0 | Status: SHIPPED | OUTPATIENT
Start: 2018-01-01 | End: 2018-01-01

## 2018-01-01 RX ORDER — ALLOPURINOL 300 MG/1
TABLET ORAL
Qty: 90 TABLET | Refills: 1 | Status: SHIPPED | OUTPATIENT
Start: 2018-01-01 | End: 2018-01-01 | Stop reason: SDUPTHER

## 2018-01-01 RX ORDER — FOLIC ACID 1 MG/1
TABLET ORAL
Qty: 180 TABLET | Refills: 1 | Status: SHIPPED | OUTPATIENT
Start: 2018-01-01 | End: 2019-01-01 | Stop reason: SDUPTHER

## 2018-01-01 RX ORDER — TESTOSTERONE CYPIONATE 200 MG/ML
200 INJECTION, SOLUTION INTRAMUSCULAR ONCE
Status: COMPLETED | OUTPATIENT
Start: 2018-01-01 | End: 2018-01-01

## 2018-01-01 RX ORDER — BUPROPION HYDROCHLORIDE 150 MG/1
TABLET ORAL
Qty: 90 TABLET | Refills: 1 | Status: SHIPPED | OUTPATIENT
Start: 2018-01-01 | End: 2018-01-01 | Stop reason: SDUPTHER

## 2018-01-01 RX ORDER — RAMIPRIL 10 MG/1
CAPSULE ORAL
Qty: 180 CAPSULE | Refills: 1 | Status: SHIPPED | OUTPATIENT
Start: 2018-01-01

## 2018-01-01 RX ORDER — MAGNESIUM OXIDE 400 MG/1
2 TABLET ORAL DAILY
Refills: 1 | COMMUNITY
Start: 2018-01-01

## 2018-01-01 RX ORDER — ALLOPURINOL 300 MG/1
TABLET ORAL
Qty: 30 TABLET | Refills: 0 | Status: SHIPPED | OUTPATIENT
Start: 2018-01-01 | End: 2018-01-01 | Stop reason: SDUPTHER

## 2018-01-01 RX ORDER — GABAPENTIN 300 MG/1
300 CAPSULE ORAL 4 TIMES DAILY
Qty: 60 CAPSULE | Refills: 3 | OUTPATIENT
Start: 2018-01-01 | End: 2018-01-01

## 2018-01-01 RX ORDER — LOVASTATIN 20 MG/1
TABLET ORAL
Qty: 30 TABLET | Refills: 0 | Status: SHIPPED | OUTPATIENT
Start: 2018-01-01 | End: 2018-01-01 | Stop reason: SDUPTHER

## 2018-01-01 RX ORDER — TRIAMCINOLONE ACETONIDE 40 MG/ML
80 INJECTION, SUSPENSION INTRA-ARTICULAR; INTRAMUSCULAR ONCE
Status: COMPLETED | OUTPATIENT
Start: 2018-01-01 | End: 2018-01-01

## 2018-01-01 RX ORDER — BUPROPION HYDROCHLORIDE 150 MG/1
TABLET ORAL
Qty: 30 TABLET | Refills: 0 | Status: SHIPPED | OUTPATIENT
Start: 2018-01-01 | End: 2018-01-01 | Stop reason: SDUPTHER

## 2018-01-01 RX ORDER — HYDROCODONE BITARTRATE AND ACETAMINOPHEN 5; 325 MG/1; MG/1
1 TABLET ORAL EVERY 6 HOURS PRN
Qty: 10 TABLET | Refills: 0 | Status: SHIPPED | OUTPATIENT
Start: 2018-01-01 | End: 2018-01-01

## 2018-01-01 RX ORDER — SITAGLIPTIN AND METFORMIN HYDROCHLORIDE 1000; 50 MG/1; MG/1
TABLET, FILM COATED ORAL
Qty: 180 TABLET | Refills: 1 | Status: SHIPPED | OUTPATIENT
Start: 2018-01-01

## 2018-01-01 RX ORDER — INSULIN ASPART 100 [IU]/ML
INJECTION, SOLUTION INTRAVENOUS; SUBCUTANEOUS
Qty: 3 ML | Refills: 2 | Status: SHIPPED | OUTPATIENT
Start: 2018-01-01 | End: 2018-01-01 | Stop reason: SDUPTHER

## 2018-01-01 RX ORDER — LOVASTATIN 20 MG/1
TABLET ORAL
Qty: 90 TABLET | Refills: 1 | Status: SHIPPED | OUTPATIENT
Start: 2018-01-01 | End: 2018-01-01 | Stop reason: SDUPTHER

## 2018-01-01 RX ORDER — LEVOTHYROXINE SODIUM 0.12 MG/1
TABLET ORAL
Qty: 90 TABLET | Refills: 1 | Status: SHIPPED | OUTPATIENT
Start: 2018-01-01

## 2018-01-01 RX ORDER — SITAGLIPTIN AND METFORMIN HYDROCHLORIDE 1000; 50 MG/1; MG/1
TABLET, FILM COATED ORAL
Qty: 180 TABLET | Refills: 1 | Status: SHIPPED | OUTPATIENT
Start: 2018-01-01 | End: 2018-01-01 | Stop reason: SDUPTHER

## 2018-01-01 RX ORDER — TERAZOSIN 10 MG/1
CAPSULE ORAL
Qty: 90 CAPSULE | Refills: 1 | Status: SHIPPED | OUTPATIENT
Start: 2018-01-01

## 2018-01-01 RX ORDER — OXYCODONE HYDROCHLORIDE AND ACETAMINOPHEN 5; 325 MG/1; MG/1
1 TABLET ORAL EVERY 6 HOURS PRN
Qty: 12 TABLET | Refills: 0 | Status: SHIPPED | OUTPATIENT
Start: 2018-01-01 | End: 2018-01-01

## 2018-01-01 RX ORDER — KETOROLAC TROMETHAMINE 30 MG/ML
30 INJECTION, SOLUTION INTRAMUSCULAR; INTRAVENOUS ONCE
Status: COMPLETED | OUTPATIENT
Start: 2018-01-01 | End: 2018-01-01

## 2018-01-01 RX ORDER — ATENOLOL 25 MG/1
TABLET ORAL
Qty: 90 TABLET | Refills: 1 | Status: SHIPPED | OUTPATIENT
Start: 2018-01-01

## 2018-01-01 RX ORDER — INSULIN GLARGINE 100 [IU]/ML
INJECTION, SOLUTION SUBCUTANEOUS
Qty: 90 ML | Refills: 1 | Status: SHIPPED | OUTPATIENT
Start: 2018-01-01 | End: 2018-01-01 | Stop reason: SDUPTHER

## 2018-01-01 RX ORDER — MAGNESIUM OXIDE 400 MG/1
TABLET ORAL
Qty: 60 TABLET | Refills: 0 | Status: SHIPPED | OUTPATIENT
Start: 2018-01-01 | End: 2019-01-01 | Stop reason: SDUPTHER

## 2018-01-01 RX ORDER — FUROSEMIDE 20 MG/1
TABLET ORAL
Qty: 90 TABLET | Refills: 1 | Status: SHIPPED | OUTPATIENT
Start: 2018-01-01

## 2018-01-01 RX ORDER — INSULIN GLARGINE 100 [IU]/ML
INJECTION, SOLUTION SUBCUTANEOUS
Qty: 90 ML | Refills: 1 | Status: SHIPPED | OUTPATIENT
Start: 2018-01-01

## 2018-01-01 RX ORDER — BUPROPION HYDROCHLORIDE 150 MG/1
TABLET ORAL
Qty: 30 TABLET | Refills: 5 | Status: SHIPPED | OUTPATIENT
Start: 2018-01-01

## 2018-01-01 RX ORDER — CLONIDINE HYDROCHLORIDE 0.1 MG/1
0.2 TABLET ORAL ONCE
Status: COMPLETED | OUTPATIENT
Start: 2018-01-01 | End: 2018-01-01

## 2018-01-01 RX ORDER — LOVASTATIN 20 MG/1
TABLET ORAL
Qty: 30 TABLET | Refills: 5 | Status: SHIPPED | OUTPATIENT
Start: 2018-01-01

## 2018-01-01 RX ORDER — TOPIRAMATE 25 MG/1
50 TABLET ORAL DAILY
Qty: 180 TABLET | Refills: 3 | Status: SHIPPED | OUTPATIENT
Start: 2018-01-01

## 2018-01-01 RX ORDER — ATENOLOL 25 MG/1
TABLET ORAL
Qty: 90 TABLET | Refills: 1 | Status: SHIPPED | OUTPATIENT
Start: 2018-01-01 | End: 2018-01-01 | Stop reason: SDUPTHER

## 2018-01-01 RX ADMIN — TRIAMCINOLONE ACETONIDE 80 MG: 40 INJECTION, SUSPENSION INTRA-ARTICULAR; INTRAMUSCULAR at 21:59

## 2018-01-01 RX ADMIN — KETOROLAC TROMETHAMINE 30 MG: 30 INJECTION, SOLUTION INTRAMUSCULAR at 14:37

## 2018-01-01 RX ADMIN — TESTOSTERONE CYPIONATE 200 MG: 200 INJECTION, SOLUTION INTRAMUSCULAR at 14:05

## 2018-01-01 RX ADMIN — CLONIDINE HYDROCHLORIDE 0.2 MG: 0.1 TABLET ORAL at 22:24

## 2018-01-16 RX ORDER — FOLIC ACID 1 MG/1
TABLET ORAL
Qty: 180 TABLET | Refills: 1 | Status: SHIPPED | OUTPATIENT
Start: 2018-01-16 | End: 2018-01-01 | Stop reason: SDUPTHER

## 2018-01-22 ENCOUNTER — OFFICE VISIT (OUTPATIENT)
Dept: INTERNAL MEDICINE | Facility: CLINIC | Age: 73
End: 2018-01-22

## 2018-01-22 ENCOUNTER — LAB REQUISITION (OUTPATIENT)
Dept: LAB | Facility: HOSPITAL | Age: 73
End: 2018-01-22

## 2018-01-22 VITALS
OXYGEN SATURATION: 96 % | WEIGHT: 263 LBS | TEMPERATURE: 98.1 F | DIASTOLIC BLOOD PRESSURE: 70 MMHG | RESPIRATION RATE: 16 BRPM | HEART RATE: 68 BPM | BODY MASS INDEX: 41.19 KG/M2 | SYSTOLIC BLOOD PRESSURE: 134 MMHG

## 2018-01-22 DIAGNOSIS — R60.1 GENERALIZED EDEMA: ICD-10-CM

## 2018-01-22 DIAGNOSIS — I10 ESSENTIAL HYPERTENSION: Primary | ICD-10-CM

## 2018-01-22 DIAGNOSIS — E66.01 MORBID OBESITY (HCC): ICD-10-CM

## 2018-01-22 DIAGNOSIS — M25.562 ACUTE PAIN OF LEFT KNEE: ICD-10-CM

## 2018-01-22 DIAGNOSIS — M16.0 PRIMARY OSTEOARTHRITIS OF BOTH HIPS: ICD-10-CM

## 2018-01-22 DIAGNOSIS — Z00.00 ROUTINE GENERAL MEDICAL EXAMINATION AT A HEALTH CARE FACILITY: ICD-10-CM

## 2018-01-22 DIAGNOSIS — E11.9 TYPE 2 DIABETES MELLITUS WITHOUT COMPLICATION, WITH LONG-TERM CURRENT USE OF INSULIN (HCC): ICD-10-CM

## 2018-01-22 DIAGNOSIS — Z79.4 TYPE 2 DIABETES MELLITUS WITHOUT COMPLICATION, WITH LONG-TERM CURRENT USE OF INSULIN (HCC): ICD-10-CM

## 2018-01-22 DIAGNOSIS — E23.0 GONADOTROPIN DEFICIENCY (HCC): ICD-10-CM

## 2018-01-22 LAB — HBA1C MFR BLD: 7.6 % (ref 4.8–5.6)

## 2018-01-22 PROCEDURE — 36415 COLL VENOUS BLD VENIPUNCTURE: CPT | Performed by: INTERNAL MEDICINE

## 2018-01-22 PROCEDURE — 96372 THER/PROPH/DIAG INJ SC/IM: CPT | Performed by: INTERNAL MEDICINE

## 2018-01-22 PROCEDURE — 99213 OFFICE O/P EST LOW 20 MIN: CPT | Performed by: INTERNAL MEDICINE

## 2018-01-22 RX ORDER — TESTOSTERONE CYPIONATE 200 MG/ML
200 INJECTION, SOLUTION INTRAMUSCULAR ONCE
Status: COMPLETED | OUTPATIENT
Start: 2018-01-22 | End: 2018-01-22

## 2018-01-22 RX ADMIN — TESTOSTERONE CYPIONATE 200 MG: 200 INJECTION, SOLUTION INTRAMUSCULAR at 12:20

## 2018-01-22 NOTE — PATIENT INSTRUCTIONS
1.  Plan Victoza increase - to 1.8 mg daily.    2.  Follow a low-calorie diabetic diet - for better diabetic control.    3.  Avoid limping while walking - protect knees and hips.    4.  Check blood sugar in the morning and before supper - every day - plan short acting insulin - 2 -3 X daily.    5.  Return visit March 16 - annual checkup fasting.

## 2018-01-22 NOTE — PROGRESS NOTES
Subjective   Alberto Bailey is a 72 y.o. male.     History of Present Illness     The patient has a greater than 20 year history of type 2 diabetes.  He has been insulin-dependent for at least 10 years.  His morning fasting blood glucoses have averaged 100 to 1:30 in recent weeks.  He is trying to follow a diabetic diet more closely and is consistent with his medications.     The following portions of the patient's history were reviewed and updated as appropriate: allergies, current medications, past family history, past medical history, past social history, past surgical history and problem list.    Review of Systems   Constitutional: Negative for appetite change and fatigue.   HENT: Negative for ear pain and sore throat.    Respiratory: Negative for cough and shortness of breath.    Cardiovascular: Negative for chest pain and palpitations.   Gastrointestinal: Negative for abdominal pain and nausea.   Musculoskeletal: Negative for arthralgias and back pain.        Persistent left hip and knee pain   Neurological: Negative for dizziness and headaches.       Objective   Blood pressure 134/70, pulse 68, temperature 98.1 °F (36.7 °C), temperature source Oral, resp. rate 16, weight 119 kg (263 lb), SpO2 96 %.    Physical Exam   Constitutional: He is oriented to person, place, and time. He appears well-developed and well-nourished. No distress.   Cardiovascular: Normal rate, regular rhythm and normal heart sounds.    Pulmonary/Chest: Effort normal. He has no wheezes. He has no rales.   Musculoskeletal:   Moderate stiffness of hips with 70% flexion-extension contraction rotation.  Left knee moderate stiffness with mild tenderness.   Neurological: He is alert and oriented to person, place, and time. He exhibits normal muscle tone. Coordination normal.   Psychiatric: He has a normal mood and affect. His behavior is normal. Judgment and thought content normal.   Nursing note and vitals reviewed.    Procedures  Assessment/Plan    Alberto was seen today for diabetes.    Diagnoses and all orders for this visit:    Essential hypertension    Type 2 diabetes mellitus without complication, with long-term current use of insulin  -     Hemoglobin A1c    Acute pain of left knee    Primary osteoarthritis of both hips    Morbid obesity    Generalized edema    Gonadotropin deficiency  -     Testosterone Cypionate (DEPOTESTOTERONE CYPIONATE) injection 200 mg; Inject 1 mL into the shoulder, thigh, or buttocks 1 (One) Time.    The patient's diabetic control has slightly improved with a hemoglobin A1c dropping 8.0 down to 7.6.  We will increase his Victoza for improved diabetic control and improved weight loss.    The patient has moderate osteoarthritis of his hips and knees.  He still does not wish to pursue surgical treatment.  I've encouraged him to stay active but avoid excessive work load on these joints.  He should use stationary cycling for daily aerobic activity.    Patient Instructions   1.  Plan Victoza increase - to 1.8 mg daily.    2.  Follow a low-calorie diabetic diet - for better diabetic control.    3.  Avoid limping while walking - protect knees and hips.    4.  Check blood sugar in the morning and before supper - every day - plan short acting insulin - 2 -3 X daily.    5.  Return visit March 16 - annual checkup fasting.    6.  Hemoglobin A1c 7.6% mildly improved.    7.  Stationary cycling 30 minutes daily - maintain steady weight loss.    Electronically signed Eric Foy M.D.1/24/2018 6:42 AM

## 2018-01-24 ENCOUNTER — TELEPHONE (OUTPATIENT)
Dept: INTERNAL MEDICINE | Facility: CLINIC | Age: 73
End: 2018-01-24

## 2018-01-31 DIAGNOSIS — Z79.4 TYPE 2 DIABETES MELLITUS WITHOUT COMPLICATION, WITH LONG-TERM CURRENT USE OF INSULIN (HCC): ICD-10-CM

## 2018-01-31 DIAGNOSIS — E11.9 TYPE 2 DIABETES MELLITUS WITHOUT COMPLICATION, WITH LONG-TERM CURRENT USE OF INSULIN (HCC): ICD-10-CM

## 2018-02-04 DIAGNOSIS — R60.1 GENERALIZED EDEMA: ICD-10-CM

## 2018-02-05 RX ORDER — FUROSEMIDE 20 MG/1
TABLET ORAL
Qty: 90 TABLET | Refills: 1 | Status: SHIPPED | OUTPATIENT
Start: 2018-02-05 | End: 2018-01-01 | Stop reason: SDUPTHER

## 2018-02-09 RX ORDER — TERAZOSIN 10 MG/1
CAPSULE ORAL
Qty: 90 CAPSULE | Refills: 1 | Status: SHIPPED | OUTPATIENT
Start: 2018-02-09 | End: 2018-01-01 | Stop reason: SDUPTHER

## 2018-02-28 DIAGNOSIS — E11.9 TYPE 2 DIABETES MELLITUS WITHOUT COMPLICATION, WITH LONG-TERM CURRENT USE OF INSULIN (HCC): ICD-10-CM

## 2018-02-28 DIAGNOSIS — Z79.4 TYPE 2 DIABETES MELLITUS WITHOUT COMPLICATION, WITH LONG-TERM CURRENT USE OF INSULIN (HCC): ICD-10-CM

## 2018-03-02 DIAGNOSIS — E66.01 MORBID OBESITY DUE TO EXCESS CALORIES (HCC): ICD-10-CM

## 2018-03-02 RX ORDER — TOPIRAMATE 100 MG/1
TABLET, FILM COATED ORAL
Qty: 90 TABLET | Refills: 1 | Status: SHIPPED | OUTPATIENT
Start: 2018-03-02

## 2018-05-07 PROBLEM — E34.9 TESTOSTERONE DEFICIENCY: Status: ACTIVE | Noted: 2018-01-01

## 2018-05-07 NOTE — PROGRESS NOTES
Subjective   Alberto Bailey is a 72 y.o. male.     Chief Complaint   Patient presents with   • Diabetes       History of Present Illness     The patient has a greater than 20 year history of type 2 diabetes.  He has been on insulin replacement now for many years.  His health is complicated by morbid obesity and difficulty with diabetic diet compliance.  The last year he has required a combination of Lantus insulin plus Victoza.  He has had no hypoglycemic events.  A.m. blood sugars are generally 90-30.  He has no other significant diabetic complications.  Family history significant for diabetes in his mother and stroke in both parents.    The following portions of the patient's history were reviewed and updated as appropriate: allergies, current medications, past family history, past medical history, past social history, past surgical history and problem list.    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   • Edema 05/02/2016   • Gastroesophageal reflux disease 05/02/2016   • Hyperlipidemia 05/02/2016   • Hypertension 05/02/2016   • Hypothyroidism 05/02/2016   • Mitral valve insufficiency 05/02/2016   • Morbid obesity 05/02/2016   • Osteoarthritis of hip 05/02/2016   • Preventative health care 08/19/2016   • Elevated PSA 11/02/2016   • Hyperuricemia 03/10/2017   • Left knee pain 01/22/2018   • Testosterone deficiency 05/07/2018     Resolved Ambulatory Problems     Diagnosis Date Noted   • Gout 05/02/2016   • Gonadotropin deficiency 05/02/2016   • Exposure to hepatitis C 12/02/2016     Past Medical History:   Diagnosis Date   • Allergic rhinitis Lifelong   • Chronic edema 2012   • Depression 2010   • Depression 2014   • Diabetes mellitus, type 2 1995   • Elevated PSA 2016   • Generalized osteoarthritis Adulthood   • GERD (gastroesophageal reflux disease) 2014   •  Hyperlipidemia, mixed Adulthood   • Hypertension    • Hyperuricemia    • Hypomagnesemia    • Hypothyroidism 2006   • Mitral regurgitation    • Morbid obesity Adulthood   • Sleep disturbances Adulthood   • Testosterone deficiency    • Vitamin D deficiency      Past Surgical History:   Procedure Laterality Date   • TONSILLECTOMY       Family History   Problem Relation Age of Onset   • Alzheimer's disease Mother    • Breast cancer Mother    • Diabetes Mother    • Stroke Mother       age 75 approximately   • Thyroid disease Mother    • Hypertension Father    • Kidney failure Father       age 75   • Stroke Father    • Glaucoma Sister    • Osteoarthritis Sister      Knee replacement   • Brain cancer Paternal Grandmother    • Glaucoma Maternal Grandmother    • Colon polyps Cousin      Social History     Social History   • Marital status: Single     Spouse name: N/A   • Number of children: N/A   • Years of education: N/A     Occupational History   • Not on file.     Social History Main Topics   • Smoking status: Never Smoker   • Smokeless tobacco: Never Used   • Alcohol use No   • Drug use: No   • Sexual activity: Not on file     Other Topics Concern   • Not on file     Social History Narrative    Domestic life : Lives in private home alone        Bahai : Yarsani        Sleep hygiene :   Currently adequate - 6 hours nightly        Caffeine use : No caffeine        Exercise habits : Walks 10 minutes 4 times daily while at work 3 days weekly.  Goes to [employer] intermittently.        Diet : Low calorie diabetic diet - low in salt and low in sugar        Occupation : Retired  as a .  [employer]endent at [employer] - 3 days weekly.        Hearing : No impairment        Vision : Corrects with bifocal glasses -  lost bifocals at restaurant        Driving : No limitations.             Review of Systems   Constitutional: Positive for fatigue. Negative for appetite change.         "Fatigue worse since missed testosterone last 2 months.   HENT: Negative for ear pain and sore throat.    Eyes: Negative for itching and visual disturbance.   Respiratory: Negative for cough and shortness of breath.    Cardiovascular: Negative for chest pain and palpitations.   Gastrointestinal: Negative for abdominal pain and nausea.   Endocrine: Negative for cold intolerance and heat intolerance.   Genitourinary: Negative for dysuria and hematuria.   Musculoskeletal: Positive for arthralgias and gait problem. Negative for back pain.        Right hip painful through April much improved the last 2 weeks.  Generally walking well without excessive pain.   Skin: Negative for rash and wound.   Allergic/Immunologic: Negative for environmental allergies and food allergies.   Neurological: Negative for dizziness and headaches.   Hematological: Negative for adenopathy. Does not bruise/bleed easily.   Psychiatric/Behavioral: Negative for sleep disturbance. The patient is not nervous/anxious.         Mood well compensated on bupropion       Objective   Blood pressure 132/72, pulse 79, temperature 97.9 °F (36.6 °C), temperature source Temporal Artery , height 170.2 cm (67\"), weight 117 kg (258 lb 9.6 oz), SpO2 94 %.    Physical Exam   Constitutional: He is oriented to person, place, and time. He appears well-developed and well-nourished. No distress.   HENT:   Right Ear: External ear normal.   Left Ear: External ear normal.   Nose: Nose normal.   Mouth/Throat: Oropharynx is clear and moist.   Eyes: EOM are normal. Pupils are equal, round, and reactive to light. No scleral icterus.   Neck: Normal range of motion. Neck supple. No JVD present. No thyromegaly present.   Cardiovascular: Normal rate, regular rhythm, normal heart sounds and intact distal pulses.    Pulmonary/Chest: Effort normal and breath sounds normal. He has no wheezes. He has no rales.   Abdominal: Soft. Bowel sounds are normal. He exhibits no mass. There is no " tenderness. No hernia.   Severely obese   Genitourinary: Rectum normal, prostate normal and penis normal.   Musculoskeletal: He exhibits no edema.   Hips and knees full flexion and extension without pain.  Hips full Range Of Motion with Mild Tenderness.  Straight Leg Raises 45° Bilaterally with Mild Low Back Pain.     Lymphadenopathy:     He has no cervical adenopathy.   Neurological: He is alert and oriented to person, place, and time. He displays normal reflexes. No cranial nerve deficit. He exhibits normal muscle tone. Coordination normal.   Vibratory normal  Romberg negative  Gait normal  Plantars downgoing  Monofilament testing normal   Skin: Skin is warm and dry. No rash noted.   Psychiatric: He has a normal mood and affect. His behavior is normal. Judgment and thought content normal.   Nursing note and vitals reviewed.      ECG 12 Lead  Date/Time: 5/7/2018 1:53 PM  Performed by: ERIC FOY  Authorized by: ERIC FOY   Interpreted by ED physician: Eric Foy M.D.  Comparison: compared with previous ECG from 3/10/2017  Similar to previous ECG  Rhythm: sinus rhythm  Rate: normal  BPM: 72  Conduction: right bundle branch block, LAFB and non-specific intraventricular conduction delay  ST Segments: ST segments normal  T Waves: T waves normal  QRS axis: normal  Other: no other findings  Clinical impression: abnormal ECG  Comments: Indication - hypertension and mitral regurgitation.  Baseline EKG            Assessment/Plan   Alberto was seen today for diabetes.    Diagnoses and all orders for this visit:    Mixed hyperlipidemia  -     Comprehensive Metabolic Panel  -     Lipid Panel    Type 2 diabetes mellitus without complication, with long-term current use of insulin  -     Liraglutide (VICTOZA) 18 MG/3ML solution pen-injector injection; Inject 1.8 mg under the skin Daily.  -     Hemoglobin A1c  -     Ambulatory Referral to Endocrinology    Essential hypertension  -     Urinalysis With /  Culture If Indicated - Urine, Clean Catch    Non-rheumatic mitral regurgitation  -     ECG 12 Lead    Gastroesophageal reflux disease without esophagitis  -     CBC & Differential  -     C-reactive Protein    Morbid obesity  -     Ambulatory Referral to Endocrinology    Hypothyroidism due to acquired atrophy of thyroid  -     TSH  -     T4, Free  -     T3, Free    Primary osteoarthritis of both hips    Chronic pain of left knee    Recurrent major depressive disorder, in full remission  -     Testosterone Cypionate (DEPOTESTOTERONE CYPIONATE) injection 200 mg; Inject 1 mL into the shoulder, thigh, or buttocks 1 (One) Time.    Generalized edema  -     Magnesium    Elevated PSA  -     PSA Screen    Hyperuricemia    Vitamin D deficiency  -     Vitamin D 25 Hydroxy    Vitamin B deficiency  -     Vitamin B12    Encounter for screening for malignant neoplasm of prostate   -     PSA Screen    Testosterone deficiency    Other orders  -     topiramate (TOPAMAX) 25 MG tablet; Take 2 tablets by mouth Daily.  -     Microscopic Examination      The patient's diabetic control is poor despite complex medications including Januvia, Lantus insulin, NovoLog insulin, and Victoza.  His morbid obesity and poor dietary compliance are clearly complicating factors.  I've asked him to seek new consultation with an endocrinologist.    The patient's had a lifelong history of morbid obesity with a body weight up to 300 many years ago.  5 years ago his body weight was 280 and his lost 32 pounds since then.  He finds phentermine and topiramate particularly helpful with appetite suppression.    The patient has moderate cardiovascular risk especially with a family history of stroke in both parents.  His LDL cholesterol is excellent on atorvastatin 20 mg.  He should continue on aspirin for primary prevention.     In the last few years he has recognized a degree of clinical depression.  He has been on bupropion for about 40 years and feels his  greatly stabilized his mood.  He has never menstruated depressive ideology or interpersonal interaction.  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    Patient's had a significant degree of fatigue related to testosterone deficiency.  He has felt much better on replacement in recent years.  He has skipped testosterone the last few months for unclear reasons.    Patient Instructions   1.  New endocrine consultation - to manage type 2 diabetes.    2.  Continue low-calorie diabetic diet - low in salt low in sugar.    3.  Lose 1 pound monthly - goal body weight below 220.    4.  Walk daily as tolerated - use hand weights 20 minutes 3 days weekly - for fitness.    5.  Next checkup in 3 months - primary care doctor.    6.  Diabetic control is poor.  Hemoglobin A1c 8.2%.  Must follow a weight loss diabetic diet and daily exercise.    7.  Blood count reveals mild anemia little different than 1 year ago.    8.  PSA mildly elevated 5.4%.  Take Cipro 500 twice daily for 7 days and repeat blood test at 8 days.    9.  Magnesium level mildly low at 1.6.  Continue Mag-Ox 400 2 tablets daily    10.  Other laboratory tests are acceptable and require no change in treatment.    11.  Increase Lantus insulin 90 units daily.    Current Outpatient Prescriptions:   •  ACCU-CHEK SERENITY PLUS test strip, TEST 4 TIMES DAILY, Disp: 200 each, Rfl: 2  •  acetaminophen (TYLENOL) 500 MG tablet, Take 2 tablets by mouth as needed., Disp: , Rfl:   •  allopurinol (ZYLOPRIM) 300 MG tablet, TAKE 1 TABLET BY MOUTH DAILY., Disp: 90 tablet, Rfl: 1  •  aspirin 81 MG tablet, Take 1 tablet by mouth every morning., Disp: , Rfl:   •  atenolol (TENORMIN) 25 MG tablet, TAKE 1 TABLET BY MOUTH EVERY MORNING, Disp: 90 tablet, Rfl: 1  •  buPROPion XL (WELLBUTRIN XL) 150 MG 24 hr tablet, TAKE 1 TABLET BY MOUTH EVERY MORNING, Disp: 90 tablet, Rfl: 1  •  Cholecalciferol (VITAMIN D) 1000 UNITS tablet, Take 3 tablets by mouth daily.,  Disp: , Rfl:   •  fluticasone (FLONASE) 50 MCG/ACT nasal spray, 1 spray into each nostril daily as needed., Disp: , Rfl:   •  folic acid (FOLVITE) 1 MG tablet, TAKE 2 TABLETS EVERY DAY, Disp: 180 tablet, Rfl: 1  •  furosemide (LASIX) 20 MG tablet, TAKE 1 TABLET BY MOUTH DAILY., Disp: 90 tablet, Rfl: 1  •  insulin aspart (NOVOLOG FLEXPEN) 100 UNIT/ML solution pen-injector sc pen, INJECT 6-12 UNITS 3 TIMES A DAY AS DIRECTED SLIDING SCALE, Disp: 3 mL, Rfl: 2  •  JANUMET  MG per tablet, TAKE 1 TABLET BY MOUTH 2 (TWO) TIMES A DAY., Disp: 180 tablet, Rfl: 1  •  LANTUS SOLOSTAR 100 UNIT/ML injection pen, INJECT 80 UNITS UNDER THE SKIN EVERY EVENING., Disp: 90 mL, Rfl: 1  •  levothyroxine (SYNTHROID, LEVOTHROID) 125 MCG tablet, TAKE 1 TABLET BY MOUTH EVERY MORNING., Disp: 90 tablet, Rfl: 1  •  Liraglutide (VICTOZA) 18 MG/3ML solution pen-injector injection, Inject 1.8 mg under the skin Daily., Disp: 2 pen, Rfl: 3  •  lovastatin (MEVACOR) 20 MG tablet, TAKE 1 TABLET BY MOUTH EVERY NIGHT., Disp: 90 tablet, Rfl: 1  •  magnesium oxide (MAGOX) 400 (241.3 Mg) MG tablet tablet, TAKE 2 TABLETS BY MOUTH DAILY., Disp: 180 tablet, Rfl: 1  •  metOLazone (ZAROXOLYN) 5 MG tablet, TAKE 0.5 TABLET BY MOUTH TWICE A WEEK, Disp: 30 tablet, Rfl: 1  •  Multiple Vitamin tablet, Take 1 tablet by mouth every morning., Disp: , Rfl:   •  phentermine (ADIPEX-P) 37.5 MG tablet, Take 1 tablet by mouth Every Morning Before Breakfast., Disp: 30 tablet, Rfl: 2  •  potassium chloride (K-DUR,KLOR-CON) 20 MEQ CR tablet, Take 1 tablet by mouth daily., Disp: , Rfl:   •  ramipril (ALTACE) 10 MG capsule, TAKE 1 CAPSULE BY MOUTH EVERY 12 (TWELVE) HOURS., Disp: 180 capsule, Rfl: 1  •  terazosin (HYTRIN) 10 MG capsule, TAKE ONE CAPSULE BY MOUTH AT BEDTIME, Disp: 90 capsule, Rfl: 1  •  Testosterone Cypionate (DEPOTESTOTERONE CYPIONATE) 200 MG/ML injection, Inject 1 mL into the shoulder, thigh, or buttocks Every 28 (Twenty-Eight) Days., Disp: 10 mL, Rfl: 0  •   topiramate (TOPAMAX) 100 MG tablet, TAKE 1 TABLET BY MOUTH EVERY DAY BEFORE SUPPER, Disp: 90 tablet, Rfl: 1  •  topiramate (TOPAMAX) 25 MG tablet, Take 2 tablets by mouth Daily., Disp: 180 tablet, Rfl: 3  No current facility-administered medications for this visit.     No Known Allergies    Immunization History   Administered Date(s) Administered   • Flu Vaccine High Dose PF 65YR+ 11/01/2017   • Influenza, Quadrivalent 10/16/2015   • Pneumococcal Polysaccharide (PPSV23) 01/09/2008   • Td 11/13/2002   • Tdap 03/19/2012   • influenza Split 10/31/2016     Electronically signed Eric Foy M.D.5/8/2018 8:14 AM

## 2018-05-07 NOTE — PATIENT INSTRUCTIONS
1.  New endocrine consultation - to manage type 2 diabetes.    2.  Continue low-calorie diabetic diet - low in salt low in sugar.    3.  Lose 1 pound monthly - goal body weight below 220.    4.  Walk daily as tolerated - use hand weights 20 minutes 3 days weekly - for fitness.    5.  Next checkup in 3 months - primary care doctor.

## 2018-05-11 NOTE — TELEPHONE ENCOUNTER
Called labs to pt.  Per TGF:  -Diabetic control is poor.  Hemoglobin A1c 8.2%.  Must follow a weight loss diabetic diet and daily exercise.  - Increase Lantus insulin 90 units daily.   -CBC reveals mild anemia little different than 1 year ago.  -PSA mildly elevated 5.4%.  Take Cipro 500 twice daily for 7 days and repeat blood test at 8 days.  Call to get test ordered.  -Magnesium level mildly low at 1.6.  Continue Mag-Ox 400 2 tablets daily  -Other laboratory tests are acceptable and require no change in treatment.  Pt verb understanding.

## 2018-05-31 NOTE — TELEPHONE ENCOUNTER
Called 5/29 PSA level to pt. Left VM - PSA now higher at 5.9. TGF advises seeing urologist - Dr Varghese. Requested pt to call back if in agreement, then we can send referral.

## 2018-06-21 NOTE — TELEPHONE ENCOUNTER
Called pt. He c/o burning pain radiating from his rt groin area to his rt inner thigh since yesterday. He feels he may have twisted his leg getting into his car last night. Pain increases with ambulation or standing. Improves when lying down. He's taken no medication.

## 2018-06-21 NOTE — TELEPHONE ENCOUNTER
PT IS CALLING HE IS AT WORK AND HE THINKS HE EITHER HAS A PINCHED NERVE IN HIS  HIP OR IT POSSIBLY COULD BE A SCIATIC NERVE -- HE IS IN PAIN - CANT HARDLY WALK  - WHEN HE LAYS DOWN - IT SEEMS TO BE OK -- WANTS TO KNOW WHAT HE CAN DO FOR THIS

## 2018-06-27 NOTE — TELEPHONE ENCOUNTER
BRUNO IS CALLING OVER THE WEEKEND HAD SOME ISSUE WENT TO ER AND HE WAS DIAGNOSED WITH A BULGING DISC - HE IS AT HOME IN THE BED WITH MAJOR PAIN - THEY GAVE HIM A NAME OF A DOCTOR AND TOLD HIM TO FOLLOW UP ---HE WANTS TO KNOW WHAT TGF THINKS - OR WHO HE SHOULD SEE - NEVER HAD TO DO ANYTHING LIKE THIS ---PLEASE CALL

## 2018-06-28 PROBLEM — M79.604 RIGHT LEG PAIN: Status: ACTIVE | Noted: 2018-01-01

## 2018-06-28 PROBLEM — M51.9 LUMBAR DISC DISEASE: Status: ACTIVE | Noted: 2018-01-01

## 2018-07-06 NOTE — PROGRESS NOTES
Patient: Alberto Bailey  : 1945    Primary Care Provider: Eric Foy MD    Requesting Provider: As above        History    Chief Complaint: Back and right thigh pain.    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  About 10 days ago he began having pain in his right lower back.  This has extended into the medial and lateral thigh but not below the knee.  For a while he had trouble weightbearing.  His pain went from a burning to what is more of a soreness at this point.  He has been using a rolling walker.  He denies bowel or bladder dysfunction.  He does have DJD afflicting his left knee and left hip.  His symptoms are worse with walking and standing.  He does better when sitting or lying down.  He tried undergoing an MRI study but could not tolerate lying down.  Over the last several days his pain has settled down a fair bit.    Review of Systems   Constitutional: Negative for activity change, appetite change, chills, diaphoresis, fatigue, fever and unexpected weight change.   HENT: Negative for congestion, dental problem, drooling, ear discharge, ear pain, facial swelling, hearing loss, mouth sores, nosebleeds, postnasal drip, rhinorrhea, sinus pressure, sneezing, sore throat, tinnitus, trouble swallowing and voice change.    Eyes: Negative for photophobia, pain, discharge, redness, itching and visual disturbance.   Respiratory: Negative for apnea, cough, choking, chest tightness, shortness of breath, wheezing and stridor.    Cardiovascular: Negative for chest pain, palpitations and leg swelling.   Gastrointestinal: Negative for abdominal distention, abdominal pain, anal bleeding, blood in stool, constipation, diarrhea, nausea, rectal pain and vomiting.   Endocrine: Negative for cold intolerance, heat intolerance, polydipsia, polyphagia and polyuria.   Genitourinary: Negative for decreased urine volume,  "difficulty urinating, dysuria, enuresis, flank pain, frequency, genital sores, hematuria and urgency.   Musculoskeletal: Positive for back pain and gait problem (pt ambulates w/ assistance of a walker). Negative for arthralgias, joint swelling, myalgias, neck pain and neck stiffness.   Skin: Negative for color change, pallor, rash and wound.   Allergic/Immunologic: Negative for environmental allergies, food allergies and immunocompromised state.   Neurological: Positive for numbness. Negative for dizziness, tremors, seizures, syncope, facial asymmetry, speech difficulty, weakness, light-headedness and headaches.   Hematological: Negative for adenopathy. Does not bruise/bleed easily.   Psychiatric/Behavioral: Negative for agitation, behavioral problems, confusion, decreased concentration, dysphoric mood, hallucinations, self-injury, sleep disturbance and suicidal ideas. The patient is not nervous/anxious and is not hyperactive.    All other systems reviewed and are negative.      The patient's past medical history, past surgical history, family history, and social history have been reviewed at length in the electronic medical record.    Physical Exam:   Temp 98 °F (36.7 °C) (Temporal Artery )   Ht 170.2 cm (67.01\")   Wt 116 kg (255 lb)   BMI 39.93 kg/m²   CONSTITUTIONAL: Patient is well-nourished, pleasant and appears stated age.  CV: Heart regular rate and rhythm without murmur, rub, or gallop.  PULMONARY: Lungs are clear to ascultation.  MUSCULOSKELETAL:  Straight leg raising is negative.  Isidro's Sign is negative.  ROM in back is normal.  Tenderness in the back to palpation is not observed.  NEUROLOGICAL:  Orientation, memory, attention span, language function, and cognition have been examined and are intact.  Strength is intact in the lower extremities to direct testing.  Muscle tone is normal throughout.  Station and gait are normal.  Sensation is intact to light touch testing throughout.  Deep tendon " reflexes are difficult to elicit throughout.  Coordination is intact.      Medical Decision Making    Data Review:   CT of the lumbar spine demonstrates some diffuse spondylosis.  There is probably generous stenosis at L4-5 where there may be a low-grade listhesis.    Diagnosis:   The patient has back and right thigh pain.  His thigh pain does not extend into a particular dermatomal distribution.    Treatment Options:   Fortunately the patient is improving somewhat.  I have referred him to physical therapy.  He will follow-up in our clinic in about 3 weeks.  If his symptoms persist then we will make additional efforts at obtaining a lumbar MRI study.       Diagnosis Plan   1. Spinal stenosis of lumbar region without neurogenic claudication  Ambulatory Referral to Physical Therapy Evaluate and treat   2. Lumbar spondylosis         Scribed for Waldo Luevano MD by Eileen Peters CMA on 07/06/2018 at 12:40 PM      I, Dr. Luevano, personally performed the services described in the documentation, as scribed in my presence, and it is both accurate and complete.

## 2018-07-23 NOTE — THERAPY EVALUATION
Outpatient Physical Therapy Ortho Initial Evaluation   [place]     Patient Name: Alberto Bailey  : 1945  MRN: [id number]  Today's Date: 2018      Visit Date: 2018    Patient Active Problem List   Diagnosis   • Atopic rhinitis   • Anemia   • Depression   • Diabetes mellitus (CMS/HCC)   • Aptyalism   • Edema   • Gastroesophageal reflux disease   • Hyperlipidemia   • Hypertension   • Hypothyroidism   • Mitral valve insufficiency   • Morbid obesity (CMS/HCC)   • Osteoarthritis of hip   • Preventative health care   • Elevated PSA   • Hyperuricemia   • Left knee pain   • Testosterone deficiency   • Lumbar disc disease   • Right leg pain        Past Medical History:   Diagnosis Date   • Allergic rhinitis Lifelong   • Chronic edema    • Depression      improved on bupropion   • Diabetes mellitus, type 2 (CMS/HCC)     Long-term insulin dependence   • Elevated PSA 2016    Transient 4.8   • Generalized osteoarthritis Adulthood    Advanced hip disease   • GERD (gastroesophageal reflux disease) 2014    Intermittent Zantac   • Hyperlipidemia, mixed Adulthood   • Hypertension    • Hyperuricemia     Intermittent gout - long-term allopurinol   • Hypomagnesemia     Long-term magnesium replacement   • Hypothyroidism    • Lumbar spondylosis 2018  acute right hip pain and leg pain   • Mitral regurgitation    • Morbid obesity (CMS/HCC) Adulthood    2013 body weight to 80 BMI 43   • Sleep disturbances Adulthood    Moderate with no medication treatment   • Testosterone deficiency 2014    Fatigue much improved on testosterone replacement   • Vitamin D deficiency 2010    level 8        Past Surgical History:   Procedure Laterality Date   • TONSILLECTOMY         Visit Dx:     ICD-10-CM ICD-9-CM   1. Spinal stenosis, lumbar region, without neurogenic claudication M48.061 724.02   2. Acute right-sided low back pain without sciatica M54.5 724.2             Patient History      Row Name 07/23/18 0800             History    Chief Complaint Pain  -LM      Type of Pain Back pain;Lower Extremity / Leg  -LM      Date Current Problem(s) Began 06/22/18  -LM      Brief Description of Current Complaint Client woke up that morning and had significant pain in the lower back that radiates down to the medial/lateral thigh.  States it was pain in the back but a burning sensation in the thigh.  Reports slight numbness in the knee.  -LM      Previous treatment for THIS PROBLEM Medication  -LM      Patient/Caregiver Goals Relieve pain;Return to prior level of function;Improve mobility;Improve strength  -LM      Hand Dominance right-handed  -LM      Occupation/sports/leisure activities Retired - works part time at a [employer] (2 days a week).  Bowls 3 days a week.  -LM      What clinical tests have you had for this problem? CT scan  -LM      History of Previous Related Injuries None  -LM         Pain     Pain Location Back  -LM      Pain at Present 1  -LM      Pain at Best 1  -LM      Pain at Worst 10  -LM      Pain Frequency Constant/continuous  -LM      Pain Description Burning;Throbbing  -LM      What Performance Factors Make the Current Problem(s) WORSE? Trying to walk and sitting for long periods  -LM      What Performance Factors Make the Current Problem(s) BETTER? Seems like lying in supine seemed provide the most pain relief  -LM      Is your sleep disturbed? No  -LM      Difficulties at work? Has to do a lot of sitting at work, where as before he would stand the entire time.  -LM      Difficulties with ADL's? Initially had trouble taking a shower because he couldn't stand for long periods.  However, this has improved.  -LM         Fall Risk Assessment    Any falls in the past year: Yes  -LM      Number of falls reported in the last 12 months 1  -LM      Factors that contributed to the fall: Other (comment)   L Knee gave out  -LM      Does patient have a fear of falling No  -LM         Daily  Activities    Primary Language English  -LM      How does patient learn best? Listening;Reading;Demonstration  -LM      Pt Participated in POC and Goals Yes  -LM         Safety    Are you being hurt, hit, or frightened by anyone at home or in your life? No  -LM      Are you being neglected by a caregiver No  -LM        User Key  (r) = Recorded By, (t) = Taken By, (c) = Cosigned By    Initials Name Provider Type    LM Liz Maki PT Physical Therapist              PT Ortho     Row Name 07/23/18 0800       Posture/Observations    Posture/Observations Comments Palpation: Min TTP at lateral thigh around greater trochanter  -LM       Special Tests/Palpation    Special Tests/Palpation Lumbar/SI  -LM       Lumbar/SI Special Tests    Slump Test (Neural Tension) Right:;Positive  -LM    SLR (Neural Tension) Right:;Negative  -LM    SI Compression Test (SI Dysfunction) Right:;Negative  -LM    SI Distraction Test (SI Dysfunction) Right:;Negative  -LM    ALBERTO (hip vs. SI Dysfunction) Right:;Positive  -LM       General ROM    Head/Neck/Trunk Trunk Extension;Trunk Flexion;Trunk Lt Lateral Flexion;Trunk Rt Lateral Flexion;Trunk Lt Rotation;Trunk Rt Rotation  -LM    RT Lower Ext --  -LM    GENERAL ROM COMMENTS BLE AROM WFL with exception of decreased R active hip extension  -LM       Head/Neck/Trunk    Trunk Extension AROM 25%  -LM    Trunk Flexion AROM 50%  -LM    Trunk Lt Lateral Flexion AROM 50%  -LM    Trunk Rt Lateral Flexion AROM 25%  -LM    Trunk Lt Rotation AROM 50%  -LM    Trunk Rt Rotation AROM 25%  -LM    Head/Neck/Trunk Comments Pain noted with R SB, R Rot, and Extension  -LM       MMT (Manual Muscle Testing)    Additional Documentation General Assessment (Manual Muscle Testing) (Group)  -LM       General Assessment (Manual Muscle Testing)    General Manual Muscle Testing (MMT) Assessment lower extremity strength deficits identified  -LM    Comment, General Manual Muscle Testing (MMT) Assessment R Knee flex - 4/5; R  knee ext: 4+/5; R Ankle DF - 4+/5  -LM       Lower Extremity (Manual Muscle Testing)    Lower Extremity: Manual Muscle Testing (MMT) right hip strength deficit  -LM       Right Hip (Manual Muscle Testing)    Right Hip Manual Muscle Testing (MMT) flexion;extension;abduction;adduction  -LM    MMT: Flexion, Right Hip flexion  -LM    MMT, Gross Movement: Right Hip Flexion (4-/5) good minus  -LM    MMT: Extension, Right Hip extension  -LM    MMT, Gross Movement: Right Hip Extension (2+/5) poor plus  -LM    MMT: ABduction, Right Hip abduction  -LM    MMT, Gross Movement: Right Hip ABduction (4-/5) good minus  -LM    MMT, Gross Movement: Right Hip ADduction (4-/5) good minus  -LM       Sensation    Sensation WNL? WFL  -LM    Light Touch No apparent deficits  -LM       Flexibility    Flexibility Tested? Lower Extremity  -LM       Lower Extremity Flexibility    Hamstrings Right:;Moderately limited  -LM    Hip Flexors Right:;Moderately limited  -LM    Quadriceps Right:;Moderately limited  -LM       Transfers    Comment (Transfers) Difficulty standing due to pain - able to complete but requires increased time  -LM       Gait/Stairs Assessment/Training    Comment (Gait/Stairs) Ambulates with very antalgic gait - however, client states this is due to his hip/knee on the LLE (both need replaced).  -LM      User Key  (r) = Recorded By, (t) = Taken By, (c) = Cosigned By    Initials Name Provider Type    LC Maki, PT Physical Therapist        Therapy Education  Education Details: HEP provided - ther ex includes: SKTC; LTR's; Lower limb neural tension glide in sitting; Supine Piriformis Stretch; Hamstring Stretch; Gastroc Stretch  Given: HEP  Program: New  How Provided: Verbal, Written  Provided to: Patient  Level of Understanding: Verbalized           PT OP Goals     Row Name 07/23/18 0800          PT Short Term Goals    STG Date to Achieve 08/13/18  -LM     STG 1 Independent with HEP to promote ROM/flexibility.  -LM     STG  1 Progress New  -LM     STG 2 Reports a 25% reduction in pain  -LM     STG 2 Progress New  -LM     STG 3 Increase R SB/Rot to 50% of normal  -LM     STG 3 Progress New  -LM        Long Term Goals    LTG Date to Achieve 09/03/18  -LM     LTG 1 Independent with HEP to promote strengthening/stabilization  -LM     LTG 1 Progress New  -LM     LTG 2 Client reports radicular symptoms in the RLE are gone  -LM     LTG 2 Progress New  -LM     LTG 3 Modified Oswestry decreases to 25% or less  -LM     LTG 3 Progress New  -LM     LTG 4 Client reports he can return to a normal, full work day without having to sit.  -LM     LTG 4 Progress New  -LM        Time Calculation    PT Goal Re-Cert Due Date 10/21/18  -LM       User Key  (r) = Recorded By, (t) = Taken By, (c) = Cosigned By    Initials Name Provider Type    LM Liz Maki, PT Physical Therapist              PT Assessment/Plan     Row Name 07/23/18 0800          PT Assessment    Functional Limitations Impaired gait;Limitation in home management;Limitations in community activities;Performance in leisure activities;Performance in work activities  -LM     Impairments Balance;Endurance;Gait;Impaired flexibility;Impaired muscle power;Joint mobility;Muscle strength;Pain;Poor body mechanics;Posture;Range of motion  -LM     Assessment Comments Client presents with evolving symptoms of low complexity.  Signs and symptoms consistent with low back pain with resulting R radiculopathy.  Skilled PT services warranted to improve ROM/strength and decrease pain in order for client to return to PLOF.  -LM     Please refer to paper survey for additional self-reported information Yes  -LM     Rehab Potential Good  -LM     Patient/caregiver participated in establishment of treatment plan and goals Yes  -LM     Patient would benefit from skilled therapy intervention Yes  -LM        PT Plan    PT Frequency 2x/week;3x/week  -LM     Predicted Duration of Therapy Intervention (Therapy Eval) 14 visits   -LM     Planned CPT's? PT EVAL LOW COMPLEXITY: 86362;PT RE-EVAL: 40966;PT THER PROC EA 15 MIN: 43234;PT MANUAL THERAPY EA 15 MIN: 55661;PT GAIT TRAINING EA 15 MIN: 08568;PT HOT OR COLD PACK TREAT MCARE;PT ELECTRICAL STIM UNATTEND: ;PT ULTRASOUND EA 15 MIN: 78250;PT TRACTION LUMBAR: 32950;PT IONTOPHORESIS EA 15 MIN: 85444;PT THER SUPP EA 15 MIN  -LM     PT Plan Comments Begin with ROM/flexibility of the lower back/LE's.  Progress with strengthening/stabilization as pt tolerates.  May try lumbar traction.  Manual/modalities as needed.  -LM       User Key  (r) = Recorded By, (t) = Taken By, (c) = Cosigned By    Initials Name Provider Type    LC Maki, PT Physical Therapist        Outcome Measure Options: Modifed Owestry  Modified Oswestry  Modified Oswestry Score/Comments: 42%      Time Calculation:     Therapy Suggested Charges     Code   Minutes Charges    None           Start Time: 0800     Therapy Charges for Today     Code Description Service Date Service Provider Modifiers Qty    44895887025 HC PT EVAL LOW COMPLEXITY 4 7/23/2018 Liz Maki, PT GP 1    19982666048 HC PT MOBILITY CURRENT 7/23/2018 Liz Maki, PT GP, CK 1    98446150068 HC PT MOBILITY PROJECTED 7/23/2018 Liz Maki, PT GP, CI 1        PT G-Codes  PT Professional Judgement Used?: Yes  Outcome Measure Options: Modifed Owestry  Score: 42%  Functional Limitation: Mobility: Walking and moving around  Mobility: Walking and Moving Around Current Status (): At least 40 percent but less than 60 percent impaired, limited or restricted  Mobility: Walking and Moving Around Goal Status (): At least 1 percent but less than 20 percent impaired, limited or restricted     Liz Maki PT  7/23/2018

## 2018-07-27 NOTE — THERAPY TREATMENT NOTE
Outpatient Physical Therapy Ortho Treatment Note   [place]     Patient Name: Alberto Bailey  : 1945  MRN: [id number]  Today's Date: 2018      Visit Date: 2018    Visit Dx:    ICD-10-CM ICD-9-CM   1. Spinal stenosis, lumbar region, without neurogenic claudication M48.061 724.02   2. Acute right-sided low back pain without sciatica M54.5 724.2       Patient Active Problem List   Diagnosis   • Atopic rhinitis   • Anemia   • Depression   • Diabetes mellitus (CMS/[facility])   • Aptyalism   • Edema   • Gastroesophageal reflux disease   • Hyperlipidemia   • Hypertension   • Hypothyroidism   • Mitral valve insufficiency   • Morbid obesity (CMS/[facility])   • Osteoarthritis of hip   • Preventative health care   • Elevated PSA   • Hyperuricemia   • Left knee pain   • Testosterone deficiency   • Lumbar disc disease   • Right leg pain        Past Medical History:   Diagnosis Date   • Allergic rhinitis Lifelong   • Chronic edema    • Depression      improved on bupropion   • Diabetes mellitus, type 2 (CMS/[facility])     Long-term insulin dependence   • Elevated PSA 2016    Transient 4.8   • Generalized osteoarthritis Adulthood    Advanced hip disease   • GERD (gastroesophageal reflux disease) 2014    Intermittent Zantac   • Hyperlipidemia, mixed Adulthood   • Hypertension    • Hyperuricemia     Intermittent gout - long-term allopurinol   • Hypomagnesemia     Long-term magnesium replacement   • Hypothyroidism    • Lumbar spondylosis 2018  acute right hip pain and leg pain   • Mitral regurgitation    • Morbid obesity (CMS/HCC) Adulthood    2013 body weight to 80 BMI 43   • Sleep disturbances Adulthood    Moderate with no medication treatment   • Testosterone deficiency 2014    Fatigue much improved on testosterone replacement   • Vitamin D deficiency 2010    level 8        Past Surgical History:   Procedure Laterality Date   • TONSILLECTOMY                               PT  Assessment/Plan     Row Name 07/27/18 1053          PT Assessment    Assessment Comments Pt tolerated today's session well, reviewing HEP and including progressions to core strength/flexibility.  -AC        PT Plan    PT Plan Comments Progress ROM/strengthening as tolerated; manual therapy and modalities as needed.  -AC       User Key  (r) = Recorded By, (t) = Taken By, (c) = Cosigned By    Initials Name Provider Type    EN Babin PT Physical Therapist                    Exercises     Row Name 07/27/18 1053             Subjective Comments    Subjective Comments Pt states pain in his back and R leg are better, but having some pain in L knee today.  -AC         Subjective Pain    Able to rate subjective pain? yes  -AC      Pre-Treatment Pain Level 2  -AC      Post-Treatment Pain Level 0  -AC         Total Minutes    59632 - PT Therapeutic Exercise Minutes 47  -AC         Exercise 1    Exercise Name 1 Exercises performed in clinical setting as per flow sheet.  -AC      Time 1 47  -AC      Additional Comments Ther Ex  -AC        User Key  (r) = Recorded By, (t) = Taken By, (c) = Cosigned By    Initials Name Provider Type    EN Babin PT Physical Therapist                                            Time Calculation:   Start Time: 1053  Therapy Suggested Charges     Code   Minutes Charges    54572 (CPT®) Hc Pt Neuromusc Re Education Ea 15 Min      67836 (CPT®) Hc Pt Ther Proc Ea 15 Min 47 3    08969 (CPT®) Hc Gait Training Ea 15 Min      72221 (CPT®) Hc Pt Therapeutic Act Ea 15 Min      63073 (CPT®) Hc Pt Manual Therapy Ea 15 Min      82565 (CPT®) Hc Pt Ther Massage- Per 15 Min      47020 (CPT®) Hc Pt Iontophoresis Ea 15 Min      68672 (CPT®) Hc Pt Elec Stim Ea-Per 15 Min      81182 (CPT®) Hc Pt Ultrasound Ea 15 Min      34706 (CPT®)  Pt Self Care/Mgmt/Train Ea 15 Min      Total  47 3        Therapy Charges for Today     Code Description Service Date Service Provider Modifiers Qty     40241593225  PT THER PROC EA 15 MIN 7/27/2018 Charlene Babin, PT GP 3                    Charlene Babin, PT  7/27/2018

## 2018-08-01 NOTE — THERAPY TREATMENT NOTE
Outpatient Physical Therapy Ortho Treatment Note   [place]     Patient Name: Alberto Bailey  : 1945  MRN: [id number]  Today's Date: 2018      Visit Date: 2018    Visit Dx:    ICD-10-CM ICD-9-CM   1. Spinal stenosis, lumbar region, without neurogenic claudication M48.061 724.02   2. Acute right-sided low back pain without sciatica M54.5 724.2       Patient Active Problem List   Diagnosis   • Atopic rhinitis   • Anemia   • Depression   • Diabetes mellitus (CMS/[facility])   • Aptyalism   • Edema   • Gastroesophageal reflux disease   • Hyperlipidemia   • Hypertension   • Hypothyroidism   • Mitral valve insufficiency   • Morbid obesity (CMS/[facility])   • Osteoarthritis of hip   • Preventative health care   • Elevated PSA   • Hyperuricemia   • Left knee pain   • Testosterone deficiency   • Lumbar disc disease   • Right leg pain        Past Medical History:   Diagnosis Date   • Allergic rhinitis Lifelong   • Chronic edema    • Depression      improved on bupropion   • Diabetes mellitus, type 2 (CMS/[facility])     Long-term insulin dependence   • Elevated PSA 2016    Transient 4.8   • Generalized osteoarthritis Adulthood    Advanced hip disease   • GERD (gastroesophageal reflux disease) 2014    Intermittent Zantac   • Hyperlipidemia, mixed Adulthood   • Hypertension    • Hyperuricemia     Intermittent gout - long-term allopurinol   • Hypomagnesemia     Long-term magnesium replacement   • Hypothyroidism    • Lumbar spondylosis 2018  acute right hip pain and leg pain   • Mitral regurgitation    • Morbid obesity (CMS/HCC) Adulthood    2013 body weight to 80 BMI 43   • Sleep disturbances Adulthood    Moderate with no medication treatment   • Testosterone deficiency 2014    Fatigue much improved on testosterone replacement   • Vitamin D deficiency 2010    level 8        Past Surgical History:   Procedure Laterality Date   • TONSILLECTOMY                               PT  Assessment/Plan     Row Name 08/01/18 0834          PT Assessment    Assessment Comments Pt tolerated session well and states improvement in LBP.  Primary complaints today were regarding R hip/knee pain.  -AC        PT Plan    PT Plan Comments Continue core/LE ROM/strengthening as tolerated, progressing to standing activities as tolerated.  -AC       User Key  (r) = Recorded By, (t) = Taken By, (c) = Cosigned By    Initials Name Provider Type    EN Babin PT Physical Therapist                    Exercises     Row Name 08/01/18 0834             Subjective Comments    Subjective Comments Pt states he has some pain in his L leg after going down the stairs, but pain in his back is a lot better.  -AC         Subjective Pain    Able to rate subjective pain? yes  -AC      Pre-Treatment Pain Level 2  -AC      Post-Treatment Pain Level 0  -AC         Total Minutes    54882 - PT Therapeutic Exercise Minutes 43  -AC         Exercise 1    Exercise Name 1 Exercises performed in clinical setting as per flow sheet.  -AC      Time 1 43  -AC      Additional Comments Ther Ex  -AC        User Key  (r) = Recorded By, (t) = Taken By, (c) = Cosigned By    Initials Name Provider Type    EN Babin PT Physical Therapist                                            Time Calculation:   Start Time: 0834  Therapy Suggested Charges     Code   Minutes Charges    90130 (CPT®) Hc Pt Neuromusc Re Education Ea 15 Min      30947 (CPT®) Hc Pt Ther Proc Ea 15 Min 43 3    49879 (CPT®) Hc Gait Training Ea 15 Min      78137 (CPT®) Hc Pt Therapeutic Act Ea 15 Min      85880 (CPT®) Hc Pt Manual Therapy Ea 15 Min      29734 (CPT®) Hc Pt Ther Massage- Per 15 Min      32993 (CPT®) Hc Pt Iontophoresis Ea 15 Min      94977 (CPT®) Hc Pt Elec Stim Ea-Per 15 Min      35076 (CPT®) Hc Pt Ultrasound Ea 15 Min      88027 (CPT®)  Pt Self Care/Mgmt/Train Ea 15 Min      Total  43 3        Therapy Charges for Today     Code Description Service Date  Service Provider Modifiers Qty    [id number]  PT THER PROC EA 15 MIN 8/1/2018 Charlene Babin, PT GP 3                    Charlene Babin, PT  8/1/2018

## 2018-08-03 PROBLEM — M47.816 LUMBAR SPONDYLOSIS: Status: ACTIVE | Noted: 2018-01-01

## 2018-08-03 PROBLEM — M48.061 SPINAL STENOSIS OF LUMBAR REGION WITHOUT NEUROGENIC CLAUDICATION: Status: ACTIVE | Noted: 2018-01-01

## 2018-08-03 NOTE — THERAPY TREATMENT NOTE
Outpatient Physical Therapy Ortho Treatment Note   Megan     Patient Name: Alberto Bailey  : 1945  MRN: [id number]  Today's Date: 8/3/2018      Visit Date: 2018    Visit Dx:    ICD-10-CM ICD-9-CM   1. Spinal stenosis, lumbar region, without neurogenic claudication M48.061 724.02   2. Acute right-sided low back pain without sciatica M54.5 724.2       Patient Active Problem List   Diagnosis   • Atopic rhinitis   • Anemia   • Depression   • Diabetes mellitus (CMS/[facility])   • Aptyalism   • Edema   • Gastroesophageal reflux disease   • Hyperlipidemia   • Hypertension   • Hypothyroidism   • Mitral valve insufficiency   • Morbid obesity (CMS/[facility])   • Osteoarthritis of hip   • Preventative health care   • Elevated PSA   • Hyperuricemia   • Left knee pain   • Testosterone deficiency   • Lumbar disc disease   • Right leg pain        Past Medical History:   Diagnosis Date   • Allergic rhinitis Lifelong   • Chronic edema    • Depression      improved on bupropion   • Diabetes mellitus, type 2 (CMS/[facility])     Long-term insulin dependence   • Elevated PSA 2016    Transient 4.8   • Generalized osteoarthritis Adulthood    Advanced hip disease   • GERD (gastroesophageal reflux disease) 2014    Intermittent Zantac   • Hyperlipidemia, mixed Adulthood   • Hypertension    • Hyperuricemia     Intermittent gout - long-term allopurinol   • Hypomagnesemia     Long-term magnesium replacement   • Hypothyroidism    • Lumbar spondylosis 2018  acute right hip pain and leg pain   • Mitral regurgitation    • Morbid obesity (CMS/HCC) Adulthood    2013 body weight to 80 BMI 43   • Sleep disturbances Adulthood    Moderate with no medication treatment   • Testosterone deficiency 2014    Fatigue much improved on testosterone replacement   • Vitamin D deficiency 2010    level 8        Past Surgical History:   Procedure Laterality Date   • TONSILLECTOMY                               PT  Assessment/Plan     Row Name 08/03/18 0845          PT Assessment    Assessment Comments Pt is progressing through core flexibility and core/hip strengthening exercises well, with some improvement in radicular symptoms.    -AC        PT Plan    PT Plan Comments Progress core/LE strengthening/ROM as tolerated.  Manual therapy and modalities as needed.  -AC       User Key  (r) = Recorded By, (t) = Taken By, (c) = Cosigned By    Initials Name Provider Type    Charlene Ortega, PT Physical Therapist                    Exercises     Row Name 08/03/18 0845             Subjective Comments    Subjective Comments Pt states his left knee is bothering him and he is having pain now only in R medial thigh.  -AC         Subjective Pain    Able to rate subjective pain? yes  -AC      Pre-Treatment Pain Level 4   In left knee; only mild pain in R medial thigh now  -AC      Post-Treatment Pain Level 0  -AC         Total Minutes    58635 - PT Therapeutic Exercise Minutes 40  -AC         Exercise 1    Exercise Name 1 Exercises performed in clinical setting as per flow sheet.  -AC      Time 1 40  -AC      Additional Comments Ther Ex  -AC        User Key  (r) = Recorded By, (t) = Taken By, (c) = Cosigned By    Initials Name Provider Type    Charlene Ortega, PT Physical Therapist                        Manual Rx (last 36 hours)      Manual Treatments     Row Name 08/03/18 0845             Manual Rx 1    Manual Rx 1 Location L knee  -AC      Manual Rx 1 Type Manual distraction 10x10 seconds, PROM flexion/extension  -AC      Manual Rx 1 Duration 5 mins  -AC        User Key  (r) = Recorded By, (t) = Taken By, (c) = Cosigned By    Initials Name Provider Type    Charlene Ortega, PT Physical Therapist                             Time Calculation:   Start Time: 0845  Therapy Suggested Charges     Code   Minutes Charges    90842 (CPT®)  Pt Neuromusc Re Education Ea 15 Min      85039 (CPT®) Hc Pt Ther Proc Ea 15 Min 40 3     12699 (CPT®) Hc Gait Training Ea 15 Min      54451 (CPT®) Hc Pt Therapeutic Act Ea 15 Min      23028 (CPT®) Hc Pt Manual Therapy Ea 15 Min      34087 (CPT®) Hc Pt Ther Massage- Per 15 Min      57140 (CPT®) Hc Pt Iontophoresis Ea 15 Min      09993 (CPT®) Hc Pt Elec Stim Ea-Per 15 Min      21985 (CPT®) Hc Pt Ultrasound Ea 15 Min      33291 (CPT®) Hc Pt Self Care/Mgmt/Train Ea 15 Min      Total  40 3        Therapy Charges for Today     Code Description Service Date Service Provider Modifiers Qty    58979880597 HC PT THER PROC EA 15 MIN 8/3/2018 Charlene Babin, PT GP 3                    Charlene Babin, PT  8/3/2018

## 2018-08-03 NOTE — PROGRESS NOTES
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  Since seen in the office, he has attended 3 physical therapy visits.  He has another 3 sessions lined up for next week.  He states that he is doing much better.  He has also been taking his gabapentin 300 mg 4 times a day (1 in a.m., one an afternoon, and 2 at night).    Patient still admits to intermittent discomfort with prolonged weightbearing activities, however the new physical therapy stretches he has learned help him to alleviate said symptoms quickly after their onset.  Patient is no longer using his rolling walker for ambulatory assistance .  He denies bowel or bladder dysfunction.  He also has a known history of hip and left knee degenerative joint disease, and will most likely have to undergo replacement surgeries in the future.  Patient has other complaints at this time.    Review of Systems   Constitutional: Negative for activity change, appetite change, chills, diaphoresis, fatigue, fever and unexpected weight change.   HENT: Negative for congestion, dental problem, drooling, ear discharge, ear pain, facial swelling, hearing loss, mouth sores, nosebleeds, postnasal drip, rhinorrhea, sinus pressure, sneezing, sore throat, tinnitus, trouble swallowing and voice change.    Eyes: Negative for photophobia, pain, discharge, redness, itching and visual disturbance.   Respiratory: Negative for apnea, cough, choking, chest tightness, shortness of breath, wheezing and stridor.    Cardiovascular: Negative for chest pain, palpitations and leg swelling.   Gastrointestinal:  "Negative for abdominal distention, abdominal pain, anal bleeding, blood in stool, constipation, diarrhea, nausea, rectal pain and vomiting.   Endocrine: Negative for cold intolerance, heat intolerance, polydipsia, polyphagia and polyuria.   Genitourinary: Negative for decreased urine volume, difficulty urinating, dysuria, enuresis, flank pain, frequency, genital sores, hematuria and urgency.   Musculoskeletal: Positive for arthralgias, back pain and myalgias. Negative for gait problem, joint swelling, neck pain and neck stiffness.   Skin: Negative for color change, pallor, rash and wound.   Allergic/Immunologic: Negative for environmental allergies, food allergies and immunocompromised state.   Neurological: Negative for dizziness, tremors, seizures, syncope, facial asymmetry, speech difficulty, weakness, light-headedness, numbness and headaches.   Hematological: Negative for adenopathy. Does not bruise/bleed easily.   Psychiatric/Behavioral: Negative for agitation, behavioral problems, confusion, decreased concentration, dysphoric mood, hallucinations, self-injury, sleep disturbance and suicidal ideas. The patient is not nervous/anxious and is not hyperactive.    All other systems reviewed and are negative.      The patient's past medical history, past surgical history, family history, and social history have been reviewed at length in the electronic medical record.    Physical Exam:   /80 (BP Location: Left arm)   Pulse 76   Temp 98 °F (36.7 °C)   Ht 170.2 cm (67.01\")   Wt 117 kg (257 lb)   SpO2 96%   BMI 40.24 kg/m²     Medical Decision Making  CONSTITUTIONAL:   - Patient is well-nourished  - Pleasant and appears stated age.  CV:   - Regular rate and rhythm on palpable radial pulse   - No murmur appreciated   PULMONARY:   - Speaking in full sentences  - No use of accessory muscles   - Breathing is non-labored   - No wheezing   MUSCULOSKELETAL:  - Neck tenderness to palpation is not observed.   - ROM " in neck is normal.  NEUROLOGICAL:  - A&Ox3  - Attention span, language function, and cognition are intact.  - Strength is intact in the upper and lower extremities to direct testing.  - Muscle tone is normal throughout.  - Station and gait are normal.  - Sensation is intact to light touch testing throughout.  - Deep tendon reflexes are difficult to elicit and symmetrical.    - Julian's Sign is negative bilaterally.  - No clonus is elicited.  - Coordination is intact.    Diagnosis/Treatment Options:  1. Spinal stenosis of lumbar region without neurogenic claudication  2. Lumbar spondylosis    Mr. Bailey is a 72-year-old part-time,  who presented to our office with right low back pain that extended into the medial aspect of his lateral thigh terminating above the knee.  He attempted to undergo an MRI study, but could not tolerate lying down.  Plan at that time was to commence physical therapy with a trial of gabapentin 300 mg 4 times a day.  Presently, Mr. Bailey has attended 3 physical therapy visits with significant improvement in his symptoms.  He will continue physical therapy, and step down to a home exercise program when indicated.  He will continue the gabapentin, and planned follow-up in our office in 2 months for reassessment.  If this is persist, then we will make additional efforts to obtain a new lumbar MRI with and without gadolinium.     Follow up:  FU with Dr. Luevano in 2 months    Dalia Ibarra PA-C   8/3/2018   12:35 PM

## 2018-08-06 NOTE — PROGRESS NOTES
"Subjective   Alberto Bailey is a 72 y.o. male.     Back Pain   Pertinent negatives include no abdominal pain, chest pain or headaches.   Diabetes   Pertinent negatives for hypoglycemia include no dizziness or headaches. Pertinent negatives for diabetes include no chest pain and no fatigue.      72-year-old male presents today for follow-up on lumbar disc disease.  He was referred to neurosurgery and has seen them.  They referred him to physical therapy and he has attended there, feels he is doing much better.  He is planning to continue with this treatment plan.    He is also here regarding his diabetes.  He has severe obesity with a BMI of 40.6 and struggles with insulin-dependent type 2 diabetes.  He is currently on Lantus as well as NovoLog TID before meals, as well as Janumet  and Victoza.  He is compliant with all these medications.  Lab Results   Component Value Date    HGBA1C 8.20 (H) 05/07/2018   His insulin regimen is as below:   · Blood sugar checks 3 times a day preprandial, with NovoLog based on these values   · 150-200 8u  · 200-250 10u  · >250 12u  · 90U lantus at night    The following portions of the patient's history were reviewed and updated as appropriate: allergies, current medications, past family history, past medical history, past social history, past surgical history and problem list.    Review of Systems   Constitutional: Negative for appetite change and fatigue.   HENT: Negative for ear pain and sore throat.    Respiratory: Negative for cough and shortness of breath.    Cardiovascular: Negative for chest pain and palpitations.   Gastrointestinal: Negative for abdominal pain and nausea.   Musculoskeletal: Positive for arthralgias, back pain, gait problem and myalgias.   Neurological: Negative for dizziness and headaches.       Objective   Blood pressure 162/82, pulse 88, temperature 96.8 °F (36 °C), temperature source Temporal Artery , height 170.2 cm (67.01\"), weight 118 kg (259 lb " 3.2 oz), SpO2 97 %.    Physical Exam   Constitutional: He is oriented to person, place, and time. He appears well-developed and well-nourished. No distress.   Neck: Normal range of motion. Neck supple. No JVD present.   Cardiovascular: Normal rate, regular rhythm and normal heart sounds.    Pulmonary/Chest: Effort normal and breath sounds normal. He has no wheezes. He has no rales.   Musculoskeletal: Normal range of motion. He exhibits no edema.   Moderate right sacroiliitis.  Right hip and knee has full range of motion with moderate stiffness and tenderness.  Straight leg raises 45° bilaterally with low back pain.   Lymphadenopathy:     He has no cervical adenopathy.   Neurological: He is alert and oriented to person, place, and time. He displays normal reflexes. Coordination normal.   Psychiatric: He has a normal mood and affect. His behavior is normal. Judgment and thought content normal.   Nursing note and vitals reviewed.    Procedures  Assessment/Plan   Alberto was seen today for back pain and diabetes.    Diagnoses and all orders for this visit:    Mixed hyperlipidemia  -     Lipid panel; Future  -     Comprehensive metabolic panel; Future  -     Lipid panel  -     Comprehensive metabolic panel    Essential hypertension    Type 2 diabetes mellitus without complication, with long-term current use of insulin (CMS/[facility])  -     Hemoglobin A1c; Future  -     Hemoglobin A1c    Lumbar disc disease      1.  Lumbar disc disease   Seeing PT, doing well.  Continue this treatment plan.    2.  Hyperlipidemia  Compliant with his lovastatin 20 mg.  Continued weight loss will help this.  We will recheck a lipid panel today.    3.  Insulin-dependent type 2 diabetes  Lab Results   Component Value Date    HGBA1C 8.20 (H) 05/07/2018   His insulin regimen is as below:   · Blood sugar checks 3 times a day preprandial, with NovoLog based on these values   · 150-200 8u  · 200-250 10u  · >250 12u  · 90U lantus at night  He had been  referred to endocrinology, but would prefer to have this managed by one physician.  - We will recheck an A1c today.  Pending the results of that, I would feel comfortable continuing to manage his diabetes.  - He should continue the Victoza, NovoLog, Lantus, and Janumet.    The patient has long-term severe obesity.  He has worked effectively with a weight loss program using over 30 pounds in the last 5 years.  His diabetes remains a major concern with poor control.    I'll plan to see him again in 3 months.    Patient Instructions   1.  Engage in aerobic physical activity to lower blood pressure and reduce LDL and non-HDL cholesterol.  I would advise at least 3-4 sessions per week, lasting on average 40 minutes per session, involving moderate to vigorous intensity physical activity.    2.  For the benefit of both blood pressure and LDL lowering, consume a diet emphasizing the intake of vegetables, fruits, and whole grain.  It should include low-fat dairy products, poultry, fish, legumes, nontropical vegetable oils, and nuts.  Limit your intake of sweets, sugar sweetened beverages, and red meats.    3.  Any healthy diet needs to be adapted to appropriate calorie requirements as well as personal preferences.  A diet is only as good as your adherence to it.  Aim for a calorie intake of about 2252-1778 britany per day.

## 2018-08-06 NOTE — PATIENT INSTRUCTIONS
1.  Engage in aerobic physical activity to lower blood pressure and reduce LDL and non-HDL cholesterol.  I would advise at least 3-4 sessions per week, lasting on average 40 minutes per session, involving moderate to vigorous intensity physical activity.    2.  For the benefit of both blood pressure and LDL lowering, consume a diet emphasizing the intake of vegetables, fruits, and whole grain.  It should include low-fat dairy products, poultry, fish, legumes, nontropical vegetable oils, and nuts.  Limit your intake of sweets, sugar sweetened beverages, and red meats.    3.  Any healthy diet needs to be adapted to appropriate calorie requirements as well as personal preferences.  A diet is only as good as your adherence to it.  Aim for a calorie intake of about 1370-3484 britany per day.

## 2018-08-08 NOTE — THERAPY TREATMENT NOTE
Outpatient Physical Therapy Ortho Treatment Note   Megan     Patient Name: Alberto Bailey  : 1945  MRN: [id number]  Today's Date: 2018      Visit Date: 2018    Visit Dx:    ICD-10-CM ICD-9-CM   1. Spinal stenosis, lumbar region, without neurogenic claudication M48.061 724.02   2. Acute right-sided low back pain without sciatica M54.5 724.2       Patient Active Problem List   Diagnosis   • Atopic rhinitis   • Anemia   • Depression   • Diabetes mellitus (CMS/[facility])   • Aptyalism   • Edema   • Gastroesophageal reflux disease   • Hyperlipidemia   • Hypertension   • Hypothyroidism   • Mitral valve insufficiency   • Morbid obesity (CMS/HCC)   • Osteoarthritis of hip   • Preventative health care   • Elevated PSA   • Hyperuricemia   • Left knee pain   • Testosterone deficiency   • Lumbar disc disease   • Right leg pain   • Spinal stenosis of lumbar region without neurogenic claudication   • Lumbar spondylosis        Past Medical History:   Diagnosis Date   • Allergic rhinitis Lifelong   • Chronic edema    • Depression      improved on bupropion   • Diabetes mellitus, type 2 (CMS/[facility])     Long-term insulin dependence   • Elevated PSA 2016    Transient 4.8   • Generalized osteoarthritis Adulthood    Advanced hip disease   • GERD (gastroesophageal reflux disease) 2014    Intermittent Zantac   • Hyperlipidemia, mixed Adulthood   • Hypertension    • Hyperuricemia     Intermittent gout - long-term allopurinol   • Hypomagnesemia     Long-term magnesium replacement   • Hypothyroidism    • Lumbar spondylosis     2018  acute right hip pain and leg pain   • Mitral regurgitation    • Morbid obesity (CMS/HCC) Adulthood    2013 body weight to 80 BMI 43   • Sleep disturbances Adulthood    Moderate with no medication treatment   • Testosterone deficiency 2014    Fatigue much improved on testosterone replacement   • Vitamin D deficiency 2010    level 8        Past Surgical History:    Procedure Laterality Date   • TONSILLECTOMY  1954             PT Assessment/Plan     Row Name 08/08/18 0756          PT Assessment    Assessment Comments Pt tolerated progressions in core/LE strengthening well today.  Pt states improvement in LE symptoms but still some mild LBP with increased activity.  -AC        PT Plan    PT Plan Comments Progress core/LE strengthening and flexibility as tolerated.  Potentially progress to more standing activities.  -AC       User Key  (r) = Recorded By, (t) = Taken By, (c) = Cosigned By    Initials Name Provider Type    Charlene Ortega, PT Physical Therapist                  Exercises     Row Name 08/08/18 0756             Subjective Comments    Subjective Comments Pt states his back and leg are fine but having pain in L knee.  Pt states his R thigh pain now feels just like a bruise, and L low back is only sore after walking or moving around for too long.  -AC         Subjective Pain    Able to rate subjective pain? yes  -AC      Pre-Treatment Pain Level 3   L Knee  -AC      Post-Treatment Pain Level 1  -AC         Total Minutes    12033 - PT Therapeutic Exercise Minutes 45  -AC         Exercise 1    Exercise Name 1 Exercises performed in clinical setting as per flow sheet.  -AC      Time 1 45  -AC      Additional Comments Ther Ex  -AC        User Key  (r) = Recorded By, (t) = Taken By, (c) = Cosigned By    Initials Name Provider Type    Charlene Ortega, PT Physical Therapist        Time Calculation:   Start Time: 0756  Therapy Suggested Charges     Code   Minutes Charges    55389 (CPT®) Hc Pt Neuromusc Re Education Ea 15 Min      00035 (CPT®) Hc Pt Ther Proc Ea 15 Min 45 3    84179 (CPT®) Hc Gait Training Ea 15 Min      50110 (CPT®) Hc Pt Therapeutic Act Ea 15 Min      19865 (CPT®) Hc Pt Manual Therapy Ea 15 Min      05712 (CPT®) Hc Pt Ther Massage- Per 15 Min      71153 (CPT®) Hc Pt Iontophoresis Ea 15 Min      26603 (CPT®) Hc Pt Elec Stim Ea-Per 15 Min       94491 (CPT®) Hc Pt Ultrasound Ea 15 Min      42595 (CPT®) Hc Pt Self Care/Mgmt/Train Ea 15 Min      Total  45 3        Therapy Charges for Today     Code Description Service Date Service Provider Modifiers Qty    74873792573 HC PT THER PROC EA 15 MIN 8/8/2018 Charlene Babin, PT GP 3          Charlene Babin, PT  8/8/2018

## 2018-08-10 NOTE — THERAPY TREATMENT NOTE
Outpatient Physical Therapy Ortho Treatment Note   Megan     Patient Name: Alberto Bailey  : 1945  MRN: [id number]  Today's Date: 8/10/2018      Visit Date: 08/10/2018    Visit Dx:    ICD-10-CM ICD-9-CM   1. Spinal stenosis, lumbar region, without neurogenic claudication M48.061 724.02   2. Acute right-sided low back pain without sciatica M54.5 724.2       Patient Active Problem List   Diagnosis   • Atopic rhinitis   • Anemia   • Depression   • Diabetes mellitus (CMS/[facility])   • Aptyalism   • Edema   • Gastroesophageal reflux disease   • Hyperlipidemia   • Hypertension   • Hypothyroidism   • Mitral valve insufficiency   • Morbid obesity (CMS/HCC)   • Osteoarthritis of hip   • Preventative health care   • Elevated PSA   • Hyperuricemia   • Left knee pain   • Testosterone deficiency   • Lumbar disc disease   • Right leg pain   • Spinal stenosis of lumbar region without neurogenic claudication   • Lumbar spondylosis        Past Medical History:   Diagnosis Date   • Allergic rhinitis Lifelong   • Chronic edema    • Depression      improved on bupropion   • Diabetes mellitus, type 2 (CMS/[facility])     Long-term insulin dependence   • Elevated PSA 2016    Transient 4.8   • Generalized osteoarthritis Adulthood    Advanced hip disease   • GERD (gastroesophageal reflux disease) 2014    Intermittent Zantac   • Hyperlipidemia, mixed Adulthood   • Hypertension    • Hyperuricemia     Intermittent gout - long-term allopurinol   • Hypomagnesemia     Long-term magnesium replacement   • Hypothyroidism    • Lumbar spondylosis     2018  acute right hip pain and leg pain   • Mitral regurgitation    • Morbid obesity (CMS/HCC) Adulthood    2013 body weight to 80 BMI 43   • Sleep disturbances Adulthood    Moderate with no medication treatment   • Testosterone deficiency 2014    Fatigue much improved on testosterone replacement   • Vitamin D deficiency 2010    level 8        Past Surgical History:    Procedure Laterality Date   • TONSILLECTOMY  1954           PT Assessment/Plan     Row Name 08/10/18 0757          PT Assessment    Assessment Comments Pt is progressing well with incr'd reps of core/LE strengthening and flexibility exercises.  Pt's c/c at this time is L knee pain, but states he is planning on obtaining a knee replacement surgery in the future.  However, pt stated L knee pain improved at the end of today's session.  -AC        PT Plan    PT Plan Comments Progress core/LE strengthening and ROM as tolerated.  Manual therapy and modalities as needed.  -AC       User Key  (r) = Recorded By, (t) = Taken By, (c) = Cosigned By    Initials Name Provider Type    Charlene Ortega PT Physical Therapist              Exercises     Row Name 08/10/18 8171             Subjective Comments    Subjective Comments Pt states his back is feeling fine but is having pain in his L knee.  -AC         Subjective Pain    Able to rate subjective pain? yes  -AC      Pre-Treatment Pain Level 6  -AC      Post-Treatment Pain Level 4  -AC      Subjective Pain Comment L knee  -AC         Total Minutes    02148 - PT Therapeutic Exercise Minutes 45  -AC         Exercise 1    Exercise Name 1 Exercises performed in clinical setting as per flow sheet.  -AC      Time 1 45  -AC      Additional Comments Ther Ex  -AC        User Key  (r) = Recorded By, (t) = Taken By, (c) = Cosigned By    Initials Name Provider Type    Charlene Ortega PT Physical Therapist        Time Calculation:   Start Time: 0757  Therapy Suggested Charges     Code   Minutes Charges    17137 (CPT®)  Pt Neuromusc Re Education Ea 15 Min      51148 (CPT®) Hc Pt Ther Proc Ea 15 Min 45 3    51505 (CPT®) Hc Gait Training Ea 15 Min      80879 (CPT®) Hc Pt Therapeutic Act Ea 15 Min      51368 (CPT®)  Pt Manual Therapy Ea 15 Min      95500 (CPT®)  Pt Ther Massage- Per 15 Min      84781 (CPT®)  Pt Iontophoresis Ea 15 Min      55931 (CPT®)  Pt Elec Stim  Ea-Per 15 Min      56479 (CPT®) Hc Pt Ultrasound Ea 15 Min      14983 (CPT®) Hc Pt Self Care/Mgmt/Train Ea 15 Min      Total  45 3        Therapy Charges for Today     Code Description Service Date Service Provider Modifiers Qty    60100457115 HC PT THER PROC EA 15 MIN 8/10/2018 Charlene Babin, PT GP 3        Charlene Babin, PT  8/10/2018

## 2018-08-13 NOTE — THERAPY TREATMENT NOTE
Outpatient Physical Therapy Ortho Treatment Note   Megan     Patient Name: Alberto Bailey  : 1945  MRN: [id number]  Today's Date: 2018      Visit Date: 2018    Visit Dx:    ICD-10-CM ICD-9-CM   1. Spinal stenosis, lumbar region, without neurogenic claudication M48.061 724.02   2. Acute right-sided low back pain without sciatica M54.5 724.2       Patient Active Problem List   Diagnosis   • Atopic rhinitis   • Anemia   • Depression   • Diabetes mellitus (CMS/[facility])   • Aptyalism   • Edema   • Gastroesophageal reflux disease   • Hyperlipidemia   • Hypertension   • Hypothyroidism   • Mitral valve insufficiency   • Morbid obesity (CMS/HCC)   • Osteoarthritis of hip   • Preventative health care   • Elevated PSA   • Hyperuricemia   • Left knee pain   • Testosterone deficiency   • Lumbar disc disease   • Right leg pain   • Spinal stenosis of lumbar region without neurogenic claudication   • Lumbar spondylosis        Past Medical History:   Diagnosis Date   • Allergic rhinitis Lifelong   • Chronic edema    • Depression      improved on bupropion   • Diabetes mellitus, type 2 (CMS/[facility])     Long-term insulin dependence   • Elevated PSA 2016    Transient 4.8   • Generalized osteoarthritis Adulthood    Advanced hip disease   • GERD (gastroesophageal reflux disease) 2014    Intermittent Zantac   • Hyperlipidemia, mixed Adulthood   • Hypertension    • Hyperuricemia     Intermittent gout - long-term allopurinol   • Hypomagnesemia     Long-term magnesium replacement   • Hypothyroidism    • Lumbar spondylosis     2018  acute right hip pain and leg pain   • Mitral regurgitation    • Morbid obesity (CMS/HCC) Adulthood    2013 body weight to 80 BMI 43   • Sleep disturbances Adulthood    Moderate with no medication treatment   • Testosterone deficiency 2014    Fatigue much improved on testosterone replacement   • Vitamin D deficiency 2010    level 8        Past Surgical History:    Procedure Laterality Date   • TONSILLECTOMY  1954                             PT Assessment/Plan     Row Name 08/13/18 0800          PT Assessment    Assessment Comments At the end of exercise routine client experienced nausea and vomiting. He felt that it was just due to laying supine and having heartburn/reflux. After a couple minutes he felt like he was back to normal.   -MM        PT Plan    PT Plan Comments Continue with exercises as able. May need to limit supine exercises.   -MM       User Key  (r) = Recorded By, (t) = Taken By, (c) = Cosigned By    Initials Name Provider Type    Han Henriquez, PT Physical Therapist                    Exercises     Row Name 08/13/18 0800             Subjective Comments    Subjective Comments Client reports L hip pain today at 6/10 and mild back pain.  -MM         Subjective Pain    Able to rate subjective pain? yes  -MM      Pre-Treatment Pain Level 6  -MM      Post-Treatment Pain Level 5  -MM         Total Minutes    32960 - PT Therapeutic Exercise Minutes 30  -MM         Exercise 1    Exercise Name 1 Included exercises in clinical setting per flow sheet.   -MM      Cueing 1 Verbal  -MM      Time 1 30  -MM      Additional Comments ther ex  -MM        User Key  (r) = Recorded By, (t) = Taken By, (c) = Cosigned By    Initials Name Provider Type    Han Henriquez, PT Physical Therapist                                            Time Calculation:   Start Time: 0800  Therapy Suggested Charges     Code   Minutes Charges    93005 (CPT®) Hc Pt Neuromusc Re Education Ea 15 Min      71979 (CPT®) Hc Pt Ther Proc Ea 15 Min 30 2    24753 (CPT®) Hc Gait Training Ea 15 Min      52121 (CPT®) Hc Pt Therapeutic Act Ea 15 Min      54853 (CPT®) Hc Pt Manual Therapy Ea 15 Min      39345 (CPT®) Hc Pt Ther Massage- Per 15 Min      21341 (CPT®) Hc Pt Iontophoresis Ea 15 Min      77351 (CPT®) Hc Pt Elec Stim Ea-Per 15 Min      63325 (CPT®) Hc Pt Ultrasound Ea 15 Min      80681 (CPT®)  Hc Pt Self Care/Mgmt/Train Ea 15 Min      Total  30 2        Therapy Charges for Today     Code Description Service Date Service Provider Modifiers Qty    94396191644 HC PT THER PROC EA 15 MIN 8/13/2018 Han Kendrick, PT GP 2                    Han Kendrick, PT  8/13/2018

## 2018-08-15 NOTE — THERAPY TREATMENT NOTE
Outpatient Physical Therapy Ortho Treatment Note   Megan     Patient Name: Alberto Bailey  : 1945  MRN: [id number]  Today's Date: 8/15/2018      Visit Date: 08/15/2018    Visit Dx:    ICD-10-CM ICD-9-CM   1. Spinal stenosis, lumbar region, without neurogenic claudication M48.061 724.02   2. Acute right-sided low back pain without sciatica M54.5 724.2       Patient Active Problem List   Diagnosis   • Atopic rhinitis   • Anemia   • Depression   • Diabetes mellitus (CMS/[facility])   • Aptyalism   • Edema   • Gastroesophageal reflux disease   • Hyperlipidemia   • Hypertension   • Hypothyroidism   • Mitral valve insufficiency   • Morbid obesity (CMS/HCC)   • Osteoarthritis of hip   • Preventative health care   • Elevated PSA   • Hyperuricemia   • Left knee pain   • Testosterone deficiency   • Lumbar disc disease   • Right leg pain   • Spinal stenosis of lumbar region without neurogenic claudication   • Lumbar spondylosis        Past Medical History:   Diagnosis Date   • Allergic rhinitis Lifelong   • Chronic edema    • Depression      improved on bupropion   • Diabetes mellitus, type 2 (CMS/[facility])     Long-term insulin dependence   • Elevated PSA 2016    Transient 4.8   • Generalized osteoarthritis Adulthood    Advanced hip disease   • GERD (gastroesophageal reflux disease) 2014    Intermittent Zantac   • Hyperlipidemia, mixed Adulthood   • Hypertension    • Hyperuricemia     Intermittent gout - long-term allopurinol   • Hypomagnesemia     Long-term magnesium replacement   • Hypothyroidism    • Lumbar spondylosis     2018  acute right hip pain and leg pain   • Mitral regurgitation    • Morbid obesity (CMS/HCC) Adulthood    2013 body weight to 80 BMI 43   • Sleep disturbances Adulthood    Moderate with no medication treatment   • Testosterone deficiency 2014    Fatigue much improved on testosterone replacement   • Vitamin D deficiency 2010    level 8        Past Surgical History:    Procedure Laterality Date   • TONSILLECTOMY  1954           PT Assessment/Plan     Row Name 08/15/18 0800          PT Assessment    Assessment Comments Today's session was limited to standing/sitting activities d/t pt's reports of continued acid reflux/vomiting, and served as a progression from supine activities.  Pt tolerated well and stated L knee pain improved with standing activities.    -AC        PT Plan    PT Plan Comments Progress core/LE strengthening and ROM as tolerated.  Manual therapy and modalities as needed.  -AC       User Key  (r) = Recorded By, (t) = Taken By, (c) = Cosigned By    Initials Name Provider Type    Charlene Ortega, PT Physical Therapist              Exercises     Row Name 08/15/18 0800             Subjective Comments    Subjective Comments Client reports vomiting the past two mornings and in the middle of the night, states antacids did not really help.  States L knee is feeling better today.  -AC         Subjective Pain    Able to rate subjective pain? yes  -AC      Pre-Treatment Pain Level 5  -AC      Post-Treatment Pain Level 3  -AC         Total Minutes    50531 - PT Therapeutic Exercise Minutes 40  -AC         Exercise 1    Exercise Name 1 Exercises performed in clinical setting as per flow sheet.  -AC      Time 1 40  -AC      Additional Comments Ther Ex  -AC        User Key  (r) = Recorded By, (t) = Taken By, (c) = Cosigned By    Initials Name Provider Type    Charlene Ortega, PT Physical Therapist        Time Calculation:   Start Time: 0800  Therapy Suggested Charges     Code   Minutes Charges    07971 (CPT®)  Pt Neuromusc Re Education Ea 15 Min      49063 (CPT®)  Pt Ther Proc Ea 15 Min 40 3    47301 (CPT®) Hc Gait Training Ea 15 Min      49449 (CPT®)  Pt Therapeutic Act Ea 15 Min      27115 (CPT®)  Pt Manual Therapy Ea 15 Min      85665 (CPT®)  Pt Ther Massage- Per 15 Min      01391 (CPT®)  Pt Iontophoresis Ea 15 Min      04345 (CPT®)  Pt Elec Stim  Ea-Per 15 Min      36821 (CPT®) Hc Pt Ultrasound Ea 15 Min      39171 (CPT®) Hc Pt Self Care/Mgmt/Train Ea 15 Min      01735 (CPT®) Hc Pt Prosthetic (S) Train Initial Encounter, Each 15 Min      98145 (CPT®) Hc Orthotic(S) Mgmt/Train Initial Encounter, Each 15min      85457 (CPT®) Hc Pt Aquatic Therapy Ea 15 Min      91116 (CPT®) Hc Pt Orthotic(S)/Prosthetic(S) Encounter, Each 15 Min      Total  40 3        Therapy Charges for Today     Code Description Service Date Service Provider Modifiers Qty    20673110935 HC PT THER PROC EA 15 MIN 8/15/2018 Charlene Babin, PT GP 3        Charlene Babin, PT  8/15/2018

## 2018-08-17 NOTE — THERAPY TREATMENT NOTE
Outpatient Physical Therapy Ortho Treatment Note   Megan     Patient Name: Alberto Bailey  : 1945  MRN: [id number]  Today's Date: 2018      Visit Date: 2018    Visit Dx:    ICD-10-CM ICD-9-CM   1. Spinal stenosis, lumbar region, without neurogenic claudication M48.061 724.02   2. Acute right-sided low back pain without sciatica M54.5 724.2       Patient Active Problem List   Diagnosis   • Atopic rhinitis   • Anemia   • Depression   • Diabetes mellitus (CMS/[facility])   • Aptyalism   • Edema   • Gastroesophageal reflux disease   • Hyperlipidemia   • Hypertension   • Hypothyroidism   • Mitral valve insufficiency   • Morbid obesity (CMS/HCC)   • Osteoarthritis of hip   • Preventative health care   • Elevated PSA   • Hyperuricemia   • Left knee pain   • Testosterone deficiency   • Lumbar disc disease   • Right leg pain   • Spinal stenosis of lumbar region without neurogenic claudication   • Lumbar spondylosis        Past Medical History:   Diagnosis Date   • Allergic rhinitis Lifelong   • Chronic edema    • Depression      improved on bupropion   • Diabetes mellitus, type 2 (CMS/[facility])     Long-term insulin dependence   • Elevated PSA 2016    Transient 4.8   • Generalized osteoarthritis Adulthood    Advanced hip disease   • GERD (gastroesophageal reflux disease) 2014    Intermittent Zantac   • Hyperlipidemia, mixed Adulthood   • Hypertension    • Hyperuricemia     Intermittent gout - long-term allopurinol   • Hypomagnesemia     Long-term magnesium replacement   • Hypothyroidism    • Lumbar spondylosis     2018  acute right hip pain and leg pain   • Mitral regurgitation    • Morbid obesity (CMS/HCC) Adulthood    2013 body weight to 80 BMI 43   • Sleep disturbances Adulthood    Moderate with no medication treatment   • Testosterone deficiency 2014    Fatigue much improved on testosterone replacement   • Vitamin D deficiency 2010    level 8        Past Surgical History:    Procedure Laterality Date   • TONSILLECTOMY  1954           PT Assessment/Plan     Row Name 08/17/18 0755          PT Assessment    Assessment Comments Today's session included supine activities to assist with R-sided low back pain, as well as standing activities that were slightly regressed d/t incr'd left knee pain.  -AC        PT Plan    PT Plan Comments Progress trunk/LE ROM and strengthening exercises as tolerated.  Manual therapy and modalities as needed.  -AC       User Key  (r) = Recorded By, (t) = Taken By, (c) = Cosigned By    Initials Name Provider Type    Charlene Ortega, PT Physical Therapist              Exercises     Row Name 08/17/18 0755             Subjective Comments    Subjective Comments Pt states he is having R-sided low back pain today, believes he pulled a muscle sitting at work all day yesterday.  -AC         Subjective Pain    Able to rate subjective pain? yes  -AC      Pre-Treatment Pain Level 6   0 at rest, 5-6 in certain positions  -AC         Total Minutes    02817 - PT Therapeutic Exercise Minutes 45  -AC         Exercise 1    Exercise Name 1 Exercises performed in clinical setting as per flow sheet.  -AC      Cueing 1 Verbal;Demo  -AC      Time 1 45  -AC      Additional Comments Ther Ex  -AC        User Key  (r) = Recorded By, (t) = Taken By, (c) = Cosigned By    Initials Name Provider Type    Charlene Ortega, PT Physical Therapist        Time Calculation:   Start Time: 0755  Therapy Suggested Charges     Code   Minutes Charges    97815 (CPT®) Hc Pt Neuromusc Re Education Ea 15 Min      41197 (CPT®) Hc Pt Ther Proc Ea 15 Min 45 3    96900 (CPT®) Hc Gait Training Ea 15 Min      15633 (CPT®) Hc Pt Therapeutic Act Ea 15 Min      25741 (CPT®) Hc Pt Manual Therapy Ea 15 Min      12446 (CPT®) Hc Pt Ther Massage- Per 15 Min      37221 (CPT®) Hc Pt Iontophoresis Ea 15 Min      01869 (CPT®) Hc Pt Elec Stim Ea-Per 15 Min      92342 (CPT®) Hc Pt Ultrasound Ea 15 Min      56310  (CPT®) Hc Pt Self Care/Mgmt/Train Ea 15 Min      14995 (CPT®) Hc Pt Prosthetic (S) Train Initial Encounter, Each 15 Min      54481 (CPT®) Hc Orthotic(S) Mgmt/Train Initial Encounter, Each 15min      42406 (CPT®) Hc Pt Aquatic Therapy Ea 15 Min      57560 (CPT®) Hc Pt Orthotic(S)/Prosthetic(S) Encounter, Each 15 Min      Total  45 3        Therapy Charges for Today     Code Description Service Date Service Provider Modifiers Qty    51804776808 HC PT THER PROC EA 15 MIN 8/17/2018 Charlene Babin, PT GP 3        Charlene Babin, PT  8/17/2018

## 2018-08-24 NOTE — THERAPY PROGRESS REPORT/RE-CERT
Outpatient Physical Therapy Ortho Progress Note   [address]     Patient Name: Alberto Bailey  : 1945  MRN: [id number]  Today's Date: 2018      Visit Date: 2018    Visit Dx:    ICD-10-CM ICD-9-CM   1. Spinal stenosis, lumbar region, without neurogenic claudication M48.061 724.02   2. Acute right-sided low back pain without sciatica M54.5 724.2       Patient Active Problem List   Diagnosis   • Atopic rhinitis   • Anemia   • Depression   • Diabetes mellitus (CMS/[facility])   • Aptyalism   • Edema   • Gastroesophageal reflux disease   • Hyperlipidemia   • Hypertension   • Hypothyroidism   • Mitral valve insufficiency   • Morbid obesity (CMS/HCC)   • Osteoarthritis of hip   • Preventative health care   • Elevated PSA   • Hyperuricemia   • Left knee pain   • Testosterone deficiency   • Lumbar disc disease   • Right leg pain   • Spinal stenosis of lumbar region without neurogenic claudication   • Lumbar spondylosis        Past Medical History:   Diagnosis Date   • Allergic rhinitis Lifelong   • Chronic edema    • Depression      improved on bupropion   • Diabetes mellitus, type 2 (CMS/[facility])     Long-term insulin dependence   • Elevated PSA 2016    Transient 4.8   • Generalized osteoarthritis Adulthood    Advanced hip disease   • GERD (gastroesophageal reflux disease) 2014    Intermittent Zantac   • Hyperlipidemia, mixed Adulthood   • Hypertension    • Hyperuricemia     Intermittent gout - long-term allopurinol   • Hypomagnesemia     Long-term magnesium replacement   • Hypothyroidism    • Lumbar spondylosis     2018  acute right hip pain and leg pain   • Mitral regurgitation    • Morbid obesity (CMS/HCC) Adulthood    2013 body weight to 80 BMI 43   • Sleep disturbances Adulthood    Moderate with no medication treatment   • Testosterone deficiency 2014    Fatigue much improved on testosterone replacement   • Vitamin D deficiency 2010    level 8        Past Surgical History:    Procedure Laterality Date   • TONSILLECTOMY  1954             PT Ortho     Row Name 08/24/18 0753       Posture/Observations    Posture/Observations Comments Pt walks with antalgic gait and slightly forward flexed posture at hips, with decreased RLE step length d/t L knee pain.  -AC       Head/Neck/Trunk    Trunk Extension AROM 25%   Mild LBP  -AC    Trunk Flexion AROM 75%   HS stretch  -AC    Trunk Lt Lateral Flexion AROM 75%   Pain in L hip  -AC    Trunk Rt Lateral Flexion AROM --   75%  -AC    Trunk Lt Rotation AROM 100%  -AC    Trunk Rt Rotation AROM 75%   Strain felt in L knee  -AC       Lower Extremity (Manual Muscle Testing)    Lower Extremity: Manual Muscle Testing (MMT) left hip strength deficit;left knee strength deficit;right knee strength deficit;left ankle strength deficit;right ankle strength deficit  -AC       Right Hip (Manual Muscle Testing)    MMT: Flexion, Right Hip flexion  -AC    MMT, Gross Movement: Right Hip Flexion (5/5) normal  -AC    MMT: Extension, Right Hip extension  -AC    MMT, Gross Movement: Right Hip Extension (3/5) fair  -AC    MMT: ABduction, Right Hip abduction  -AC    MMT, Gross Movement: Right Hip ABduction (3+/5) fair plus  -AC       Left Knee (Manual Muscle Testing)    Left Knee Manual Muscle Testing (MMT) extension;flexion  -AC    MMT: Extension, Left Knee extension  -AC    MMT, Gross Movement: Left Knee Extension (4-/5) good minus  -AC    MMT: Flexion, Left Knee flexion  -AC    MMT, Gross Movement: Left Knee Flexion (4+/5) good plus  -AC       Right Knee (Manual Muscle Testing)    Right Knee Manual Muscle Testing (MMT) extension;flexion  -AC    MMT: Extension, Right Knee extension  -AC    MMT, Gross Movement: Right Knee Extension (4+/5) good plus  -AC    MMT: Flexion, Right Knee flexion  -AC    MMT, Gross Movement: Right Knee Flexion (4/5) good  -AC       Left Ankle/Foot (Manual Muscle Testing)    Left Ankle Manual Muscle Testing (MMT) dorsiflexion  -AC    MMT:  "Dorsiflexion Left Ankle Muscles dorsiflexion  -AC    MMT, Gross Movement: Left Ankle Dorsiflexion (5/5) normal  -AC       Right Ankle/Foot (Manual Muscle Testing)    Right Ankle Manual Muscle Testing (MMT) dorsiflexion  -AC    MMT: Dorsiflexion, Right Ankle Muscles dorsiflexion  -AC    MMT, Gross Movement: Right Ankle Dorsiflexion (5/5) normal  -AC      User Key  (r) = Recorded By, (t) = Taken By, (c) = Cosigned By    Initials Name Provider Type    AC Charlene Babin, PT Physical Therapist              PT Assessment/Plan     Row Name 08/24/18 0753          PT Assessment    Functional Limitations Impaired gait;Performance in leisure activities;Performance in work activities;Limitations in community activities  -AC     Impairments Muscle strength;Posture;Pain;Gait;Impaired flexibility;Range of motion  -AC     Assessment Comments Pt has been seen for 10 physical therapy visits including initial evaluation.  At this time pt has met 2/3 STGs and 2/4 LTGs, and reports complete abolition of radicular symptoms and improved low back pain. However, pt states he experiences a \"tired pain\" with any amount of walking that affects his ability to ambulate in the community and perform work duties.  Pt has demonstrated significant improvements with therapy, but would benefit from continued PT intervention to improve core strength and further decrease low back pain.    -AC     Please refer to paper survey for additional self-reported information Yes  -AC     Rehab Potential Good  -AC     Patient/caregiver participated in establishment of treatment plan and goals Yes  -AC     Patient would benefit from skilled therapy intervention Yes  -AC        PT Plan    PT Frequency 2x/week  -AC     Predicted Duration of Therapy Intervention (Therapy Eval) 4-6 weeks  -AC     Planned CPT's? PT EVAL LOW COMPLEXITY: 75532;PT THER PROC EA 15 MIN: 77408;PT MANUAL THERAPY EA 15 MIN: 82011;PT GAIT TRAINING EA 15 MIN: 03155;PT HOT OR COLD PACK TREAT " "MCARE;PT ELECTRICAL STIM UNATTEND: ;PT THER SUPP EA 15 MIN;PT THER MASS EA 15 MIN: 79609;PT SELF CARE/MGMT/TRAIN 15 MIN: 55527;PT RE-EVAL: 01088;PT THER ACT EA 15 MIN: 32845;PT NEUROMUSC RE-EDUCATION EA 15 MIN: 61849  -AC     PT Plan Comments Progress core/LE flexibility and strength as tolerated, with manual therapy and modalities as needed.  -AC       User Key  (r) = Recorded By, (t) = Taken By, (c) = Cosigned By    Initials Name Provider Type    Charlene Ortega, PT Physical Therapist              Exercises     Row Name 08/24/18 0753             Subjective Comments    Subjective Comments Pt states he is having some L knee pain this morning, but yesterday he did not have any at all.  States his thigh pain has completely resolved and just has LBP with walking he describes as a \"tired pain.\"  -AC         Subjective Pain    Able to rate subjective pain? yes  -AC      Pre-Treatment Pain Level 5   L knee pain  -AC      Post-Treatment Pain Level 2   L knee pain  -AC         Total Minutes    95863 - PT Therapeutic Exercise Minutes 55  -AC         Exercise 1    Exercise Name 1 Exercises performed in clinical setting as per flow sheet.  -AC      Cueing 1 Verbal;Demo  -AC      Time 1 55  -AC      Additional Comments Reassessment measures included in therex time  -AC        User Key  (r) = Recorded By, (t) = Taken By, (c) = Cosigned By    Initials Name Provider Type    Charlene Ortega, PT Physical Therapist              PT OP Goals     Row Name 08/24/18 0753          PT Short Term Goals    STG Date to Achieve 08/13/18  -AC     STG 1 Independent with HEP to promote ROM/flexibility.  -AC     STG 1 Progress Ongoing;Progressing  -AC     STG 2 Reports a 25% reduction in pain  -AC     STG 2 Progress Met  -AC     STG 3 Increase R SB/Rot to 50% of normal  -AC     STG 3 Progress Met  -        Long Term Goals    LTG Date to Achieve 09/03/18  -AC     LTG 1 Independent with HEP to promote strengthening/stabilization  " -AC     LTG 1 Progress Ongoing;Progressing  -AC     LTG 2 Client reports radicular symptoms in the RLE are gone  -AC     LTG 2 Progress Met  -AC     LTG 3 Modified Oswestry decreases to 25% or less  -AC     LTG 3 Progress Met   22%  -AC     LTG 4 Client reports he can return to a normal, full work day without having to sit.  -AC     LTG 4 Progress Ongoing;Progressing  -AC        Time Calculation    PT Goal Re-Cert Due Date 10/21/18  -AC       User Key  (r) = Recorded By, (t) = Taken By, (c) = Cosigned By    Initials Name Provider Type    AC Charlene Babin, PT Physical Therapist        Outcome Measure Options: Modifed Jeromeestrkristi  Modified Oswestry  Modified Oswestry Score/Comments: 22%    Time Calculation:   Start Time: 0753  Therapy Suggested Charges     Code   Minutes Charges    98833 (CPT®) Hc Pt Neuromusc Re Education Ea 15 Min      82131 (CPT®) Hc Pt Ther Proc Ea 15 Min 55 4    72124 (CPT®) Hc Gait Training Ea 15 Min      06656 (CPT®) Hc Pt Therapeutic Act Ea 15 Min      76581 (CPT®) Hc Pt Manual Therapy Ea 15 Min      07349 (CPT®) Hc Pt Ther Massage- Per 15 Min      54185 (CPT®) Hc Pt Iontophoresis Ea 15 Min      96289 (CPT®) Hc Pt Elec Stim Ea-Per 15 Min      79056 (CPT®) Hc Pt Ultrasound Ea 15 Min      21018 (CPT®) Hc Pt Self Care/Mgmt/Train Ea 15 Min      88465 (CPT®) Hc Pt Prosthetic (S) Train Initial Encounter, Each 15 Min      70295 (CPT®) Hc Orthotic(S) Mgmt/Train Initial Encounter, Each 15min      82902 (CPT®) Hc Pt Aquatic Therapy Ea 15 Min      03768 (CPT®) Hc Pt Orthotic(S)/Prosthetic(S) Encounter, Each 15 Min      Total  55 4        Therapy Charges for Today     Code Description Service Date Service Provider Modifiers Qty    89640840208 HC PT THER PROC EA 15 MIN 8/24/2018 Charlene Babin, PT GP 4          PT G-Codes  Outcome Measure Options: Modifed Garth Babin, PT  8/24/2018

## 2018-08-27 NOTE — THERAPY TREATMENT NOTE
Outpatient Physical Therapy Ortho Treatment Note   [place]     Patient Name: Alberto Bailey  : 1945  MRN: [id number]  Today's Date: 2018      Visit Date: 2018    Visit Dx:    ICD-10-CM ICD-9-CM   1. Spinal stenosis, lumbar region, without neurogenic claudication M48.061 724.02   2. Acute right-sided low back pain without sciatica M54.5 724.2       Patient Active Problem List   Diagnosis   • Atopic rhinitis   • Anemia   • Depression   • Diabetes mellitus (CMS/[facility])   • Aptyalism   • Edema   • Gastroesophageal reflux disease   • Hyperlipidemia   • Hypertension   • Hypothyroidism   • Mitral valve insufficiency   • Morbid obesity (CMS/HCC)   • Osteoarthritis of hip   • Preventative health care   • Elevated PSA   • Hyperuricemia   • Left knee pain   • Testosterone deficiency   • Lumbar disc disease   • Right leg pain   • Spinal stenosis of lumbar region without neurogenic claudication   • Lumbar spondylosis        Past Medical History:   Diagnosis Date   • Allergic rhinitis Lifelong   • Chronic edema    • Depression      improved on bupropion   • Diabetes mellitus, type 2 (CMS/[facility])     Long-term insulin dependence   • Elevated PSA 2016    Transient 4.8   • Generalized osteoarthritis Adulthood    Advanced hip disease   • GERD (gastroesophageal reflux disease) 2014    Intermittent Zantac   • Hyperlipidemia, mixed Adulthood   • Hypertension    • Hyperuricemia     Intermittent gout - long-term allopurinol   • Hypomagnesemia     Long-term magnesium replacement   • Hypothyroidism    • Lumbar spondylosis     2018  acute right hip pain and leg pain   • Mitral regurgitation    • Morbid obesity (CMS/HCC) Adulthood    2013 body weight to 80 BMI 43   • Sleep disturbances Adulthood    Moderate with no medication treatment   • Testosterone deficiency 2014    Fatigue much improved on testosterone replacement   • Vitamin D deficiency 2010    level 8        Past Surgical History:    Procedure Laterality Date   • TONSILLECTOMY  1954           PT Assessment/Plan     Row Name 08/27/18 1039          PT Assessment    Assessment Comments Pt experienced some improvement in L hip/knee pain with exercises this session, and is progressing well with core/lower quarter strengthening.  Working to progress standing activities.  -AC        PT Plan    PT Plan Comments Progress standing activities as tolerated, while still incorporating core/hip strengthening and flexibility as tolerated.  -AC       User Key  (r) = Recorded By, (t) = Taken By, (c) = Cosigned By    Initials Name Provider Type    Charlene Ortega, PT Physical Therapist              Exercises     Row Name 08/27/18 1039             Subjective Comments    Subjective Comments Pt states he is having pain in his left hip with walking today, and thinks he may have broken his toe after stubbing it this weekend.  States it bruised up his foot on Sunday but almost completely resolved today.  -AC         Subjective Pain    Able to rate subjective pain? yes  -AC      Pre-Treatment Pain Level 4   In L hip with walking.  -AC         Total Minutes    30669 - PT Therapeutic Exercise Minutes 43  -AC         Exercise 1    Exercise Name 1 Exercises performed in clinical setting as per flow sheet.  -AC      Cueing 1 Verbal  -AC      Time 1 43  -AC      Additional Comments Ther Ex  -AC        User Key  (r) = Recorded By, (t) = Taken By, (c) = Cosigned By    Initials Name Provider Type    Charlene Ortega, PT Physical Therapist        Time Calculation:   Start Time: 1039  Therapy Suggested Charges     Code   Minutes Charges    20839 (CPT®)  Pt Neuromusc Re Education Ea 15 Min      74997 (CPT®) Hc Pt Ther Proc Ea 15 Min 43 3    09517 (CPT®) Hc Gait Training Ea 15 Min      51932 (CPT®) Hc Pt Therapeutic Act Ea 15 Min      81550 (CPT®) Hc Pt Manual Therapy Ea 15 Min      55407 (CPT®)  Pt Ther Massage- Per 15 Min      51318 (CPT®)  Pt Iontophoresis Ea  15 Min      51511 (CPT®) Hc Pt Elec Stim Ea-Per 15 Min      38394 (CPT®) Hc Pt Ultrasound Ea 15 Min      77922 (CPT®) Hc Pt Self Care/Mgmt/Train Ea 15 Min      83987 (CPT®) Hc Pt Prosthetic (S) Train Initial Encounter, Each 15 Min      89416 (CPT®) Hc Orthotic(S) Mgmt/Train Initial Encounter, Each 15min      44715 (CPT®) Hc Pt Aquatic Therapy Ea 15 Min      48951 (CPT®) Hc Pt Orthotic(S)/Prosthetic(S) Encounter, Each 15 Min      Total  43 3        Therapy Charges for Today     Code Description Service Date Service Provider Modifiers Qty    76851075788 HC PT THER PROC EA 15 MIN 8/27/2018 Charlene Babin, PT GP 3        Charlene Babin, PT  8/27/2018

## 2018-09-05 NOTE — THERAPY TREATMENT NOTE
Outpatient Physical Therapy Ortho Treatment Note   [facility]     Patient Name: Alberto Bailey  : 1945  MRN: [id number]  Today's Date: 2018      Visit Date: 2018    Visit Dx:    ICD-10-CM ICD-9-CM   1. Spinal stenosis, lumbar region, without neurogenic claudication M48.061 724.02   2. Acute right-sided low back pain without sciatica M54.5 724.2       Patient Active Problem List   Diagnosis   • Atopic rhinitis   • Anemia   • Depression   • Diabetes mellitus (CMS/[facility])   • Aptyalism   • Edema   • Gastroesophageal reflux disease   • Hyperlipidemia   • Hypertension   • Hypothyroidism   • Mitral valve insufficiency   • Morbid obesity (CMS/HCC)   • Osteoarthritis of hip   • Preventative health care   • Elevated PSA   • Hyperuricemia   • Left knee pain   • Testosterone deficiency   • Lumbar disc disease   • Right leg pain   • Spinal stenosis of lumbar region without neurogenic claudication   • Lumbar spondylosis        Past Medical History:   Diagnosis Date   • Allergic rhinitis Lifelong   • Chronic edema    • Depression      improved on bupropion   • Diabetes mellitus, type 2 (CMS/[facility])     Long-term insulin dependence   • Elevated PSA 2016    Transient 4.8   • Generalized osteoarthritis Adulthood    Advanced hip disease   • GERD (gastroesophageal reflux disease) 2014    Intermittent Zantac   • Hyperlipidemia, mixed Adulthood   • Hypertension    • Hyperuricemia     Intermittent gout - long-term allopurinol   • Hypomagnesemia     Long-term magnesium replacement   • Hypothyroidism    • Lumbar spondylosis     2018  acute right hip pain and leg pain   • Mitral regurgitation    • Morbid obesity (CMS/HCC) Adulthood    2013 body weight to 80 BMI 43   • Sleep disturbances Adulthood    Moderate with no medication treatment   • Testosterone deficiency 2014    Fatigue much improved on testosterone replacement   • Vitamin D deficiency 2010    level 8        Past Surgical History:    Procedure Laterality Date   • TONSILLECTOMY  1954           PT Assessment/Plan     Row Name 09/05/18 0755          PT Assessment    Assessment Comments Pt reports notable improvements in back/R leg pain, with only a tired feeling in low back with prolonged walking.  Discussed potential of making next session the final visit for this episode and discharging pt with home program.  Discussed potential of obtaining a new referral to focus on improving L knee/hip pain.  -AC        PT Plan    PT Plan Comments Perform one more treatment session and update home program.  -AC       User Key  (r) = Recorded By, (t) = Taken By, (c) = Cosigned By    Initials Name Provider Type    Charlene Ortega, PT Physical Therapist              Exercises     Row Name 09/05/18 0755             Subjective Comments    Subjective Comments Pt states his L knee hurt so bad yesterday he could barely walk.  States his back and R leg pain are better, and now just has a tired feeling in his back if he walks > about 1/8th a mile.  However, this is improved from prior to initiating PT.  -AC         Subjective Pain    Able to rate subjective pain? yes  -AC      Pre-Treatment Pain Level 3   L knee  -AC      Post-Treatment Pain Level 2   L knee  -AC         Total Minutes    85634 - PT Therapeutic Exercise Minutes 43  -AC         Exercise 1    Exercise Name 1 Exercises performed in clinical setting as per flow sheet.  -AC      Cueing 1 Verbal;Demo  -AC      Time 1 43  -AC      Additional Comments Ther Ex  -AC        User Key  (r) = Recorded By, (t) = Taken By, (c) = Cosigned By    Initials Name Provider Type    Charlene Ortega, PT Physical Therapist        Time Calculation:   Start Time: 0755  Therapy Suggested Charges     Code   Minutes Charges    90284 (CPT®) Hc Pt Neuromusc Re Education Ea 15 Min      69750 (CPT®) Hc Pt Ther Proc Ea 15 Min 43 3    31808 (CPT®) Hc Gait Training Ea 15 Min      35382 (CPT®) Hc Pt Therapeutic Act Ea 15 Min       49577 (CPT®) Hc Pt Manual Therapy Ea 15 Min      48780 (CPT®) Hc Pt Ther Massage- Per 15 Min      38755 (CPT®) Hc Pt Iontophoresis Ea 15 Min      09569 (CPT®) Hc Pt Elec Stim Ea-Per 15 Min      46347 (CPT®) Hc Pt Ultrasound Ea 15 Min      30843 (CPT®) Hc Pt Self Care/Mgmt/Train Ea 15 Min      41610 (CPT®) Hc Pt Prosthetic (S) Train Initial Encounter, Each 15 Min      85240 (CPT®) Hc Orthotic(S) Mgmt/Train Initial Encounter, Each 15min      23627 (CPT®) Hc Pt Aquatic Therapy Ea 15 Min      85411 (CPT®) Hc Pt Orthotic(S)/Prosthetic(S) Encounter, Each 15 Min      Total  43 3        Therapy Charges for Today     Code Description Service Date Service Provider Modifiers Qty    20498514487 HC PT THER PROC EA 15 MIN 9/5/2018 Charlene Babin, PT GP 3        Charlene Babin, PT  9/5/2018

## 2018-09-07 NOTE — THERAPY DISCHARGE NOTE
Outpatient Physical Therapy Ortho Treatment Note/Discharge Summary   Megan     Patient Name: Alberto Bailey  : 1945  MRN: [id number]  Today's Date: 2018      Visit Date: 2018    Visit Dx:    ICD-10-CM ICD-9-CM   1. Spinal stenosis, lumbar region, without neurogenic claudication M48.061 724.02   2. Acute right-sided low back pain without sciatica M54.5 724.2       Patient Active Problem List   Diagnosis   • Atopic rhinitis   • Anemia   • Depression   • Diabetes mellitus (CMS/HCC)   • Aptyalism   • Edema   • Gastroesophageal reflux disease   • Hyperlipidemia   • Hypertension   • Hypothyroidism   • Mitral valve insufficiency   • Morbid obesity (CMS/HCC)   • Osteoarthritis of hip   • Preventative health care   • Elevated PSA   • Hyperuricemia   • Left knee pain   • Testosterone deficiency   • Lumbar disc disease   • Right leg pain   • Spinal stenosis of lumbar region without neurogenic claudication   • Lumbar spondylosis        Past Medical History:   Diagnosis Date   • Allergic rhinitis Lifelong   • Chronic edema    • Depression      improved on bupropion   • Diabetes mellitus, type 2 (CMS/[facility])     Long-term insulin dependence   • Elevated PSA 2016    Transient 4.8   • Generalized osteoarthritis Adulthood    Advanced hip disease   • GERD (gastroesophageal reflux disease) 2014    Intermittent Zantac   • Hyperlipidemia, mixed Adulthood   • Hypertension    • Hyperuricemia     Intermittent gout - long-term allopurinol   • Hypomagnesemia     Long-term magnesium replacement   • Hypothyroidism    • Lumbar spondylosis     2018  acute right hip pain and leg pain   • Mitral regurgitation    • Morbid obesity (CMS/HCC) Adulthood    2013 body weight to 80 BMI 43   • Sleep disturbances Adulthood    Moderate with no medication treatment   • Testosterone deficiency 2014    Fatigue much improved on testosterone replacement   • Vitamin D deficiency 2010    level 8        Past  Surgical History:   Procedure Laterality Date   • TONSILLECTOMY  1954             PT Ortho     Row Name 09/07/18 0800       Head/Neck/Trunk    Trunk Extension AROM 25%  -AC    Trunk Flexion AROM 100%  -AC    Trunk Lt Lateral Flexion AROM 75%  -AC    Trunk Rt Lateral Flexion AROM 75%  -AC       Left Hip (Manual Muscle Testing)    MMT, Left Hip: Manual Muscle Testing (MMT) ABD: 4-/5  -AC       Right Hip (Manual Muscle Testing)    MMT: ABduction, Right Hip abduction  -AC    MMT, Gross Movement: Right Hip ABduction (4-/5) good minus  -AC       Left Knee (Manual Muscle Testing)    MMT: Extension, Left Knee extension  -AC    MMT, Gross Movement: Left Knee Extension (4-/5) good minus  -AC       Right Knee (Manual Muscle Testing)    MMT: Extension, Right Knee extension  -AC    MMT, Gross Movement: Right Knee Extension (4/5) good  -AC      User Key  (r) = Recorded By, (t) = Taken By, (c) = Cosigned By    Initials Name Provider Type    Charlene Ortega PT Physical Therapist              Exercises     Row Name 09/07/18 0800             Subjective Comments    Subjective Comments Pt states his L knee and hip are pretty sore today, but no sore than during previous session.  -AC         Subjective Pain    Able to rate subjective pain? yes  -AC      Pre-Treatment Pain Level 3   L knee and hip  -AC      Post-Treatment Pain Level 3  -AC         Total Minutes    35445 - PT Therapeutic Exercise Minutes 45  -AC         Exercise 1    Exercise Name 1 Exercises performed in clinical setting as per flow sheet.  -AC      Cueing 1 Verbal;Demo  -AC      Time 1 45  -AC      Additional Comments Ther Ex  -AC        User Key  (r) = Recorded By, (t) = Taken By, (c) = Cosigned By    Initials Name Provider Type    Charlene Ortega, PT Physical Therapist              PT OP Goals     Row Name 09/07/18 0800          PT Short Term Goals    STG Date to Achieve 08/13/18  -AC     STG 1 Independent with HEP to promote ROM/flexibility.  -AC      STG 1 Progress Met  -AC     STG 2 Reports a 25% reduction in pain  -AC     STG 2 Progress Met  -AC     STG 3 Increase R SB/Rot to 50% of normal  -AC     STG 3 Progress Met  -AC        Long Term Goals    LTG Date to Achieve 09/03/18  -AC     LTG 1 Independent with HEP to promote strengthening/stabilization  -AC     LTG 1 Progress Met  -AC     LTG 2 Client reports radicular symptoms in the RLE are gone  -AC     LTG 2 Progress Met  -AC     LTG 3 Modified Oswestry decreases to 25% or less  -AC     LTG 3 Progress Met   22%  -AC     LTG 4 Client reports he can return to a normal, full work day without having to sit.  -AC     LTG 4 Progress Met  -AC        Time Calculation    PT Goal Re-Cert Due Date 10/21/18  -       User Key  (r) = Recorded By, (t) = Taken By, (c) = Cosigned By    Initials Name Provider Type    Charlene Ortega, PT Physical Therapist          Therapy Education  Education Details: Pt provided updated HEP for L knee pain management: SLR, hip ABD, standing knee flexion, PROM knee flexion with towel, sit to stands, and SKTC.  Green TB provided.  Given: HEP  Program: New  How Provided: Verbal, Demonstration, Written  Provided to: Patient  Level of Understanding: Verbalized, Demonstrated    Outcome Measure Options: Alejandro Liang         Time Calculation:   Start Time: 0800  Therapy Suggested Charges     Code   Minutes Charges    29147 (CPT®) Hc Pt Neuromusc Re Education Ea 15 Min      25029 (CPT®) Hc Pt Ther Proc Ea 15 Min 45 3    35860 (CPT®) Hc Gait Training Ea 15 Min      44094 (CPT®) Hc Pt Therapeutic Act Ea 15 Min      28644 (CPT®) Hc Pt Manual Therapy Ea 15 Min      82107 (CPT®) Hc Pt Ther Massage- Per 15 Min      10797 (CPT®) Hc Pt Iontophoresis Ea 15 Min      31430 (CPT®) Hc Pt Elec Stim Ea-Per 15 Min      19763 (CPT®) Hc Pt Ultrasound Ea 15 Min      21679 (CPT®) Hc Pt Self Care/Mgmt/Train Ea 15 Min      80088 (CPT®) Hc Pt Prosthetic (S) Train Initial Encounter, Each 15 Min      25017 (CPT®)  "Hc Orthotic(S) Mgmt/Train Initial Encounter, Each 15min      11766 (CPT®) Hc Pt Aquatic Therapy Ea 15 Min      20666 (CPT®) Hc Pt Orthotic(S)/Prosthetic(S) Encounter, Each 15 Min      Total  45 3        Therapy Charges for Today     Code Description Service Date Service Provider Modifiers Qty    75545996777 HC PT MOBILITY CURRENT 9/7/2018 Charlene Babin, PT GP, CI 1    01499087841 HC PT MOBILITY PROJECTED 9/7/2018 Charlene Babin, PT GP, CI 1    [id number] HC PT MOBILITY DISCHARGE 9/7/2018 Charlene Babin, PT GP, CI 1    35588424416 HC PT THER PROC EA 15 MIN 9/7/2018 Charlene Babin, PT GP, KX 3          PT G-Codes  PT Professional Judgement Used?: Yes  Outcome Measure Options: Alejandro Liang  Functional Limitation: Mobility: Walking and moving around  Mobility: Walking and Moving Around Current Status (): At least 1 percent but less than 20 percent impaired, limited or restricted  Mobility: Walking and Moving Around Goal Status (): At least 1 percent but less than 20 percent impaired, limited or restricted  Mobility: Walking and Moving Around Discharge Status (): At least 1 percent but less than 20 percent impaired, limited or restricted     OP PT Discharge Summary  Date of Discharge: 09/07/18  Reason for Discharge: All goals achieved, Maximum functional potential achieved  Outcomes Achieved: Able to achieve all goals within established timeline  Discharge Destination: Home with home program  Discharge Instructions/Additional Comments: Pt was able to meet all STGs and LTGs established for low back and radicular pain into RLE, with only an occasional \"tired\" feeling in low back with activity.  Pt's main issue with mobility is L knee/hip pain at this time. Pt provided HEP with exercises to address these areas, and encouraged to return with a referral to treat these areas if he so desires.      Charlene Babin, PT  9/7/2018       "

## 2018-10-17 NOTE — PROGRESS NOTES
Patient: Alberto Bailey  : 1945    Primary Care Provider: Gianfranco Alvarado DO    Requesting Provider: As above        History    Chief Complaint: Back and right thigh pain.    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  The pain involved the medial and lateral aspects of the right thigh did not extend below the knee.  He attended therapy and at this point his pain has completely resolved in his thigh and back.  He took Neurontin for about 5 weeks and then simply discontinued the medicine.  He denies any weakness or numbness.    Review of Systems   Constitutional: Negative for activity change, appetite change, chills, diaphoresis, fatigue, fever and unexpected weight change.   HENT: Negative for congestion, dental problem, drooling, ear discharge, ear pain, facial swelling, hearing loss, mouth sores, nosebleeds, postnasal drip, rhinorrhea, sinus pressure, sneezing, sore throat, tinnitus, trouble swallowing and voice change.    Eyes: Negative for photophobia, pain, discharge, redness, itching and visual disturbance.   Respiratory: Negative for apnea, cough, choking, chest tightness, shortness of breath, wheezing and stridor.    Cardiovascular: Negative for chest pain, palpitations and leg swelling.   Gastrointestinal: Negative for abdominal distention, abdominal pain, anal bleeding, blood in stool, constipation, diarrhea, nausea, rectal pain and vomiting.   Endocrine: Negative for cold intolerance, heat intolerance, polydipsia, polyphagia and polyuria.   Genitourinary: Negative for decreased urine volume, difficulty urinating, dysuria, enuresis, flank pain, frequency, genital sores, hematuria and urgency.   Musculoskeletal: Positive for arthralgias, back pain and myalgias. Negative for gait problem, joint swelling, neck pain and neck stiffness.   Skin: Negative for color change, pallor, rash and  "wound.   Allergic/Immunologic: Negative for environmental allergies, food allergies and immunocompromised state.   Neurological: Negative for dizziness, tremors, seizures, syncope, facial asymmetry, speech difficulty, weakness, light-headedness, numbness and headaches.   Hematological: Negative for adenopathy. Does not bruise/bleed easily.   Psychiatric/Behavioral: Negative for agitation, behavioral problems, confusion, decreased concentration, dysphoric mood, hallucinations, self-injury, sleep disturbance and suicidal ideas. The patient is not nervous/anxious and is not hyperactive.    All other systems reviewed and are negative.      The patient's past medical history, past surgical history, family history, and social history have been reviewed at length in the electronic medical record.    Physical Exam:   Temp 97.1 °F (36.2 °C) (Temporal Artery )   Resp 17   Ht 170.2 cm (67.01\")   Wt 121 kg (266 lb 9.6 oz)   BMI 41.74 kg/m²   MUSCULOSKELETAL:  Straight leg raising is negative.  Isidro's Sign is negative.  ROM in back is normal.  Tenderness in the back to palpation is not observed.  NEUROLOGICAL:  Strength is intact in the lower extremities to direct testing.  Muscle tone is normal throughout.  Station and gait are normal.  Sensation is intact to light touch testing throughout.      Medical Decision Making    Diagnosis:   Back and right thigh pain of unclear etiology, improved.    Treatment Options:   Fortunately the patient is doing better.  He will follow-up on an as-needed basis.  If his symptoms recur then he will contact my office and we will try and set up some MRI imaging of his low back.       Diagnosis Plan   1. Spinal stenosis of lumbar region without neurogenic claudication     2. Lumbar spondylosis         Scribed for Waldo Luevano MD by Bee Carbone CMA on 10/17/2018 8:26 AM       I, Dr. Luevano, personally performed the services described in the documentation, as scribed in my " presence, and it is both accurate and complete.

## 2018-11-14 NOTE — PROGRESS NOTES
"Subjective   Alberto Bailey is a 73 y.o. male.     History of Present Illness   72-year-old male presents today for follow-up on his diabetes mellitus as well as lumbar disc disease.  He reports good compliance with his diabetic medications.  He is currently on a combination of insulin as well as oral and injectable medication.  He checks his blood sugars usually around 3 times a day, with prandial insulin.  He uses 90 units of Lantus at night.  He denies any hypoglycemic episodes.    The following portions of the patient's history were reviewed and updated as appropriate: allergies, current medications, past family history, past medical history, past social history, past surgical history and problem list.    Review of Systems   Constitutional: Negative for appetite change.   HENT: Negative for ear pain and sore throat.    Respiratory: Negative for cough and shortness of breath.    Cardiovascular: Negative for palpitations.   Gastrointestinal: Negative for nausea.   Endocrine:        Diabetes, insulin-dependent   Musculoskeletal: Positive for arthralgias, gait problem and myalgias.       Objective   Blood pressure 138/84, pulse 87, height 170.2 cm (67.01\"), weight 118 kg (260 lb), SpO2 98 %.    Physical Exam   Constitutional: He appears well-developed and well-nourished.   HENT:   Head: Normocephalic and atraumatic.   Eyes: EOM are normal. Pupils are equal, round, and reactive to light.   Neck: Normal range of motion. Neck supple.   Cardiovascular: Normal rate and regular rhythm.   Pulmonary/Chest: Effort normal and breath sounds normal.   Abdominal: Soft. Bowel sounds are normal.   Nursing note and vitals reviewed.    Procedures  Assessment/Plan   Alberto was seen today for follow-up.    Diagnoses and all orders for this visit:    Type 2 diabetes mellitus without complication, with long-term current use of insulin (CMS/[facility])  -     POC Glycosylated Hemoglobin (Hb A1C)  -     Comprehensive metabolic panel; Future  -  "    Cancel: Comprehensive metabolic panel  -     Comprehensive Metabolic Panel  -     Lipid Panel  -     T4, Free  -     TSH  -     Uric Acid    Mixed hyperlipidemia  -     Lipid panel; Future  -     Comprehensive metabolic panel; Future  -     Cancel: Comprehensive metabolic panel  -     Cancel: Lipid panel  -     Comprehensive Metabolic Panel  -     Lipid Panel  -     T4, Free  -     TSH  -     Uric Acid    Hypothyroidism due to acquired atrophy of thyroid  -     TSH; Future  -     T4, free; Future  -     Cancel: T4, free  -     Cancel: TSH  -     Comprehensive Metabolic Panel  -     Lipid Panel  -     T4, Free  -     TSH  -     Uric Acid    Hyperuricemia  -     Uric acid; Future  -     Cancel: Uric acid  -     Comprehensive Metabolic Panel  -     Lipid Panel  -     T4, Free  -     TSH  -     Uric Acid    Other orders  -     Flu Vaccine High Dose PF 65YR+ ([id number])      #1 Lumbar disc disease   He continues with physical therapy and is fairly well.  He should continue his current treatment plan.  Continue regular exercise of the low impact Friday will help.    #2 Hyperlipidemia  Compliant with his lovastatin 20 mg.  LDL excellent at 51. Continued weight loss will help this.  Lab Results   Component Value Date    CHOL 103 08/19/2016    CHLPL 112 11/14/2018    TRIG 140 11/14/2018    HDL 33 (L) 11/14/2018    LDL 51 11/14/2018     #3 Insulin-dependent type 2 diabetes  Lab Results   Component Value Date    HGBA1C 7.1 11/14/2018   Insulin regimen as below:  · Blood sugar checks 3 times a day preprandial, with NovoLog based on these values   · 150-200 8u  · 200-250 10u  · >250 12u  · 90U lantus at night  He had been referred to endocrinology, but would prefer to have this managed by one physician.  - A1c doing very well, approaching goal and improved from August (7.2%)  - He should continue the Victoza, NovoLog, Lantus, and Janumet.  - He should conduct foot checks daily, counseled on daily home care.  He is in need of  dental checkup.    #4 HTN  Modestly controlled, 138/84 today approaching goal. Continue weight loss efforts, continue medications.    #5 Obesity  The patient has long-term severe obesity.  He has worked effectively with a weight loss program losing over 30 pounds in the last 5 years.  His diabetes remains a major concern with poor control. He uses Wellbutrin plus Topiramate for weight loss (as well as depression)    #6 Depression  He is stable on Wellbutrin XL 150mg daily    #7 Hypothyroidism  Stable on synthroid 125mcg  Lab Results   Component Value Date    TSH 2.203 11/14/2018     I'll plan to see him again in 3 months.    Patient Instructions   1.  Continue same medications and supplements - as listed.    2.  Continue well-balanced diabetic diet - low in salt and low in sugar.    3.  Maintain a routine exercise program - every week.    4.  A letter will be sent with your test results.    5.  Check your feet for wounds and ulcers daily. Use a hand mirror to check bottoms and backs.    6.  Try to lose about 5 pounds by next visit.    7.  Make an appointment with a dentist for a checkup.

## 2018-11-14 NOTE — PATIENT INSTRUCTIONS
1.  Continue same medications and supplements - as listed.    2.  Continue well-balanced diabetic diet - low in salt and low in sugar.    3.  Maintain a routine exercise program - every week.    4.  A letter will be sent with your test results.    5.  Check your feet for wounds and ulcers daily. Use a hand mirror to check bottoms and backs.    6.  Try to lose about 5 pounds by next visit.    7.  Make an appointment with a dentist for a checkup.   I, Nichole Frey, performed the initial face to face bedside interview with this patient regarding history of present illness, review of symptoms and relevant past medical, social and family history.  I completed an independent physical examination.  I was the initial provider who evaluated this patient. I have signed out the follow up of any pending tests (i.e. labs, radiological studies) to the ACP.  I have communicated the patient’s plan of care and disposition with the ACP.

## 2019-01-01 ENCOUNTER — LAB REQUISITION (OUTPATIENT)
Dept: LAB | Facility: HOSPITAL | Age: 74
End: 2019-01-01

## 2019-01-01 ENCOUNTER — OFFICE VISIT (OUTPATIENT)
Dept: FAMILY MEDICINE CLINIC | Facility: CLINIC | Age: 74
End: 2019-01-01

## 2019-01-01 VITALS
BODY MASS INDEX: 40.87 KG/M2 | SYSTOLIC BLOOD PRESSURE: 138 MMHG | DIASTOLIC BLOOD PRESSURE: 82 MMHG | WEIGHT: 261 LBS | OXYGEN SATURATION: 97 % | HEART RATE: 93 BPM

## 2019-01-01 DIAGNOSIS — E78.2 MIXED HYPERLIPIDEMIA: ICD-10-CM

## 2019-01-01 DIAGNOSIS — E11.9 TYPE 2 DIABETES MELLITUS WITHOUT COMPLICATION, WITH LONG-TERM CURRENT USE OF INSULIN (HCC): ICD-10-CM

## 2019-01-01 DIAGNOSIS — M17.12 PRIMARY OSTEOARTHRITIS OF LEFT KNEE: ICD-10-CM

## 2019-01-01 DIAGNOSIS — Z79.4 TYPE 2 DIABETES MELLITUS WITHOUT COMPLICATION, WITH LONG-TERM CURRENT USE OF INSULIN (HCC): ICD-10-CM

## 2019-01-01 DIAGNOSIS — Z79.4 TYPE 2 DIABETES MELLITUS WITHOUT COMPLICATION, WITH LONG-TERM CURRENT USE OF INSULIN (HCC): Primary | ICD-10-CM

## 2019-01-01 DIAGNOSIS — Z00.00 ROUTINE GENERAL MEDICAL EXAMINATION AT A HEALTH CARE FACILITY: ICD-10-CM

## 2019-01-01 DIAGNOSIS — E11.9 TYPE 2 DIABETES MELLITUS WITHOUT COMPLICATION, WITH LONG-TERM CURRENT USE OF INSULIN (HCC): Primary | ICD-10-CM

## 2019-01-01 LAB
ALBUMIN SERPL-MCNC: 4.36 G/DL (ref 3.2–4.8)
ALBUMIN/GLOB SERPL: 2.2 G/DL (ref 1.5–2.5)
ALP SERPL-CCNC: 99 U/L (ref 25–100)
ALT SERPL-CCNC: 28 U/L (ref 7–40)
AST SERPL-CCNC: 29 U/L (ref 0–33)
BILIRUB SERPL-MCNC: 0.6 MG/DL (ref 0.3–1.2)
BUN SERPL-MCNC: 26 MG/DL (ref 9–23)
BUN/CREAT SERPL: 21.8 (ref 7–25)
CALCIUM SERPL-MCNC: 9.9 MG/DL (ref 8.7–10.4)
CHLORIDE SERPL-SCNC: 100 MMOL/L (ref 99–109)
CHOLEST SERPL-MCNC: 147 MG/DL (ref 0–200)
CO2 SERPL-SCNC: 27 MMOL/L (ref 20–31)
CREAT SERPL-MCNC: 1.19 MG/DL (ref 0.6–1.3)
GLOBULIN SER CALC-MCNC: 1.9 GM/DL
GLUCOSE SERPL-MCNC: 106 MG/DL (ref 70–100)
HBA1C MFR BLD: 7.1 %
HDLC SERPL-MCNC: 34 MG/DL (ref 40–60)
LDLC SERPL CALC-MCNC: 83 MG/DL (ref 0–100)
POTASSIUM SERPL-SCNC: 4.1 MMOL/L (ref 3.5–5.5)
PROT SERPL-MCNC: 6.3 G/DL (ref 5.7–8.2)
SODIUM SERPL-SCNC: 139 MMOL/L (ref 132–146)
TRIGL SERPL-MCNC: 149 MG/DL (ref 0–150)
VLDLC SERPL CALC-MCNC: 29.8 MG/DL

## 2019-01-01 PROCEDURE — 83036 HEMOGLOBIN GLYCOSYLATED A1C: CPT | Performed by: FAMILY MEDICINE

## 2019-01-01 PROCEDURE — 20610 DRAIN/INJ JOINT/BURSA W/O US: CPT | Performed by: FAMILY MEDICINE

## 2019-01-01 PROCEDURE — 36415 COLL VENOUS BLD VENIPUNCTURE: CPT | Performed by: FAMILY MEDICINE

## 2019-01-01 PROCEDURE — 99214 OFFICE O/P EST MOD 30 MIN: CPT | Performed by: FAMILY MEDICINE

## 2019-01-01 RX ORDER — FOLIC ACID 1 MG/1
TABLET ORAL
Qty: 180 TABLET | Refills: 1 | Status: SHIPPED | OUTPATIENT
Start: 2019-01-01

## 2019-01-01 RX ORDER — MAGNESIUM OXIDE 400 MG/1
TABLET ORAL
Qty: 60 TABLET | Refills: 0 | Status: SHIPPED | OUTPATIENT
Start: 2019-01-01

## 2019-01-01 RX ORDER — TRIAMCINOLONE ACETONIDE 40 MG/ML
40 INJECTION, SUSPENSION INTRA-ARTICULAR; INTRAMUSCULAR ONCE
Status: COMPLETED | OUTPATIENT
Start: 2019-01-01 | End: 2019-01-01

## 2019-01-01 RX ADMIN — TRIAMCINOLONE ACETONIDE 40 MG: 40 INJECTION, SUSPENSION INTRA-ARTICULAR; INTRAMUSCULAR at 08:50

## 2019-03-04 NOTE — PROGRESS NOTES
Subjective   Alberto Bailey is a 73 y.o. male.     History of Present Illness   72-year-old male presents today for follow-up on his diabetes mellitus as well as lumbar disc disease.  He reports good compliance with his diabetic medications.  He is currently on a combination of insulin as well as oral and injectable medication.  He checks his blood sugars usually around 3 times a day, with prandial insulin.  He uses 90 units of Lantus at night.  He denies any hypoglycemic episodes.    The following portions of the patient's history were reviewed and updated as appropriate: allergies, current medications, past family history, past medical history, past social history, past surgical history and problem list.    Review of Systems   Constitutional: Negative for appetite change.   HENT: Negative for ear pain and sore throat.    Respiratory: Negative for cough and shortness of breath.    Cardiovascular: Negative for palpitations.   Gastrointestinal: Negative for nausea.   Endocrine:        Diabetes, insulin-dependent   Musculoskeletal: Positive for arthralgias, gait problem and myalgias.       Objective   Blood pressure 138/82, pulse 93, weight 118 kg (261 lb), SpO2 97 %.    Physical Exam   Constitutional: He appears well-developed and well-nourished.   HENT:   Head: Normocephalic and atraumatic.   Eyes: EOM are normal. Pupils are equal, round, and reactive to light.   Neck: Normal range of motion. Neck supple.   Cardiovascular: Normal rate and regular rhythm.   Pulmonary/Chest: Effort normal and breath sounds normal.   Abdominal: Soft. Bowel sounds are normal.   Nursing note and vitals reviewed.    Procedures   Procedure: Injection of the left knee joint  Prior to performance of the knee injection, a discussion of this procedure and alternative treatments was conducted with the patient.  Possible complications were discussed, and all questions were answered.  An informed consent was reviewed with the patient, and a  signed copy will be scanned into the chart.     Anterolateral Peripatellar Approach  An anterolateral peripatellar approach was used.  The patient is seated at the edge of the exam table with the left knee flexed to 90° with the lower leg overhanging the edge.  The site was identified and marked below the inferior pole of the patella and 2 cm laterally.  The area was aseptically prepped.  The site was topically anesthetized and distracted using ethyl chloride. A 25-gauge 1-1/2 inch needle was then used to introduce an injectate consisting of 1 cc of 40 mg/mL triamcinolone and 2 cc of 0.5% ropivacaine. Proper placement was confirmed via joint fluid aspiration into the syringe.     The needle was withdrawn.  No bleeding was encountered.  The injection site was cleaned and a dry sterile dressing was applied.     The patient tolerated the procedure well without complications. he reported complete relief of pain within 5 minutes.  Assessment/Plan   Alberto was seen today for diabetes.    Diagnoses and all orders for this visit:    Type 2 diabetes mellitus without complication, with long-term current use of insulin (CMS/[facility])  -     POC Glycosylated Hemoglobin (Hb A1C)  -     Comprehensive Metabolic Panel; Future  -     Comprehensive Metabolic Panel    Mixed hyperlipidemia  -     Comprehensive Metabolic Panel; Future  -     Lipid Panel; Future  -     Comprehensive Metabolic Panel  -     Lipid Panel    Primary osteoarthritis of left knee  -     triamcinolone acetonide (KENALOG-40) injection 40 mg; Inject 1 mL into the appropriate joint as directed by provider 1 (One) Time.      #1 Lumbar disc disease   He continues with physical therapy and is fairly well.  He should continue his current treatment plan.  Continue regular exercise of the low impact Friday will help.    #2 Hyperlipidemia  Compliant with his lovastatin 20 mg.  LDL excellent at 51. Continued weight loss will help this.  Lab Results   Component Value Date     CHOL 103 08/19/2016    CHLPL 147 02/15/2019    TRIG 149 02/15/2019    HDL 34 (L) 02/15/2019    LDL 83 02/15/2019     #3 Insulin-dependent type 2 diabetes  Lab Results   Component Value Date    HGBA1C 7.1 02/15/2019   Insulin regimen as below:  · Blood sugar checks 3 times a day preprandial, with NovoLog based on these values   · 150-200 8u  · 200-250 10u  · >250 12u  · 90U lantus at night  He had been referred to endocrinology, but would prefer to have this managed by one physician.  - A1c doing very well, approaching goal and stable  - He should continue the Victoza, NovoLog, Lantus, and Janumet.  - He should conduct foot checks daily, counseled on daily home care.  He is in need of dental checkup.    #4 HTN  Modestly controlled, 138/82 today approaching goal. Continue weight loss efforts, continue medications.    #5 Obesity  The patient has long-term severe obesity.  He has worked effectively with a weight loss program losing over 30 pounds in the last 5 years.  His diabetes remains a major concern with poor control. He uses Wellbutrin plus Topiramate for weight loss (as well as depression)    #6 Depression  He is stable on Wellbutrin XL 150mg daily    #7 Hypothyroidism  Stable on synthroid 125mcg  Lab Results   Component Value Date    TSH 2.203 11/14/2018     #8 arthritis of the left knee  Appropriate for a cortisone injection, which was received today.  Tolerated well.    I'll plan to see him again in 3 months.    Patient Instructions   INSTRUCTIONS TO FOLLOW AFTER A CORTISONE INJECTION  1. The pain relieving medicine in the shot will wear off in the next 12-18 hours. After this, the pain at the site may return for a short time until the steroid takes effect 24-48 hours later.  2. Use ice tonight 30-60 minutes or longer to minimize swelling and discomfort that might follow the injection. (Either crushed ice in a plastic bag or crushed ice in an ice cap).  3. Begin heat tomorrow for at least one hour every day  for one week. Place hot moist towels over the injection area, cover towel with plastic then apply a heating pad over the entire area.  4. Relative rest should be undertaken. You may resume normal non-painful activities.

## 2019-04-17 RX ORDER — RAMIPRIL 10 MG/1
CAPSULE ORAL
Qty: 180 CAPSULE | Refills: 1 | OUTPATIENT
Start: 2019-04-17

## 2019-04-17 RX ORDER — LEVOTHYROXINE SODIUM 0.12 MG/1
TABLET ORAL
Qty: 90 TABLET | Refills: 1 | OUTPATIENT
Start: 2019-04-17